# Patient Record
Sex: FEMALE | Race: WHITE | NOT HISPANIC OR LATINO | Employment: UNEMPLOYED | URBAN - NONMETROPOLITAN AREA
[De-identification: names, ages, dates, MRNs, and addresses within clinical notes are randomized per-mention and may not be internally consistent; named-entity substitution may affect disease eponyms.]

---

## 2020-07-21 ENCOUNTER — HOSPITAL ENCOUNTER (INPATIENT)
Facility: HOSPITAL | Age: 25
LOS: 2 days | Discharge: HOME | DRG: 571 | End: 2020-07-23
Attending: EMERGENCY MEDICINE | Admitting: FAMILY MEDICINE

## 2020-07-21 DIAGNOSIS — T14.8XXA WOUND INFECTION: Primary | ICD-10-CM

## 2020-07-21 DIAGNOSIS — L03.115 CELLULITIS OF RIGHT LOWER LEG: ICD-10-CM

## 2020-07-21 DIAGNOSIS — L08.9 WOUND INFECTION: Primary | ICD-10-CM

## 2020-07-21 PROBLEM — Z72.0 TOBACCO ABUSE: Status: ACTIVE | Noted: 2020-07-21

## 2020-07-21 LAB
ALBUMIN SERPL BCP-MCNC: 3.7 G/DL (ref 3.5–5)
ALP SERPL-CCNC: 129 U/L (ref 46–116)
ALT SERPL W P-5'-P-CCNC: 44 U/L (ref 12–78)
ANION GAP SERPL CALCULATED.3IONS-SCNC: 10 MMOL/L (ref 4–13)
AST SERPL W P-5'-P-CCNC: 20 U/L (ref 5–45)
BASOPHILS # BLD AUTO: 0.07 THOUSANDS/ΜL (ref 0–0.1)
BASOPHILS NFR BLD AUTO: 1 % (ref 0–1)
BILIRUB SERPL-MCNC: 0.26 MG/DL (ref 0.2–1)
BUN SERPL-MCNC: 8 MG/DL (ref 5–25)
CALCIUM SERPL-MCNC: 8.9 MG/DL (ref 8.3–10.1)
CHLORIDE SERPL-SCNC: 104 MMOL/L (ref 100–108)
CO2 SERPL-SCNC: 25 MMOL/L (ref 21–32)
CREAT SERPL-MCNC: 0.87 MG/DL (ref 0.6–1.3)
EOSINOPHIL # BLD AUTO: 0.18 THOUSAND/ΜL (ref 0–0.61)
EOSINOPHIL NFR BLD AUTO: 2 % (ref 0–6)
ERYTHROCYTE [DISTWIDTH] IN BLOOD BY AUTOMATED COUNT: 13.1 % (ref 11.6–15.1)
GFR SERPL CREATININE-BSD FRML MDRD: 93 ML/MIN/1.73SQ M
GLUCOSE SERPL-MCNC: 89 MG/DL (ref 65–140)
HCT VFR BLD AUTO: 44.5 % (ref 34.8–46.1)
HGB BLD-MCNC: 14.7 G/DL (ref 11.5–15.4)
IMM GRANULOCYTES # BLD AUTO: 0.05 THOUSAND/UL (ref 0–0.2)
IMM GRANULOCYTES NFR BLD AUTO: 0 % (ref 0–2)
LACTATE SERPL-SCNC: 0.7 MMOL/L (ref 0.5–2)
LYMPHOCYTES # BLD AUTO: 3.26 THOUSANDS/ΜL (ref 0.6–4.47)
LYMPHOCYTES NFR BLD AUTO: 27 % (ref 14–44)
MCH RBC QN AUTO: 30.7 PG (ref 26.8–34.3)
MCHC RBC AUTO-ENTMCNC: 33 G/DL (ref 31.4–37.4)
MCV RBC AUTO: 93 FL (ref 82–98)
MONOCYTES # BLD AUTO: 0.66 THOUSAND/ΜL (ref 0.17–1.22)
MONOCYTES NFR BLD AUTO: 6 % (ref 4–12)
NEUTROPHILS # BLD AUTO: 7.69 THOUSANDS/ΜL (ref 1.85–7.62)
NEUTS SEG NFR BLD AUTO: 64 % (ref 43–75)
NRBC BLD AUTO-RTO: 0 /100 WBCS
PLATELET # BLD AUTO: 345 THOUSANDS/UL (ref 149–390)
PMV BLD AUTO: 10 FL (ref 8.9–12.7)
POTASSIUM SERPL-SCNC: 4.2 MMOL/L (ref 3.5–5.3)
PROT SERPL-MCNC: 7.6 G/DL (ref 6.4–8.2)
RBC # BLD AUTO: 4.79 MILLION/UL (ref 3.81–5.12)
SODIUM SERPL-SCNC: 139 MMOL/L (ref 136–145)
WBC # BLD AUTO: 11.91 THOUSAND/UL (ref 4.31–10.16)

## 2020-07-21 PROCEDURE — 96365 THER/PROPH/DIAG IV INF INIT: CPT

## 2020-07-21 PROCEDURE — 99285 EMERGENCY DEPT VISIT HI MDM: CPT | Performed by: EMERGENCY MEDICINE

## 2020-07-21 PROCEDURE — 87205 SMEAR GRAM STAIN: CPT | Performed by: EMERGENCY MEDICINE

## 2020-07-21 PROCEDURE — 83605 ASSAY OF LACTIC ACID: CPT | Performed by: EMERGENCY MEDICINE

## 2020-07-21 PROCEDURE — 85025 COMPLETE CBC W/AUTO DIFF WBC: CPT | Performed by: EMERGENCY MEDICINE

## 2020-07-21 PROCEDURE — 87040 BLOOD CULTURE FOR BACTERIA: CPT | Performed by: EMERGENCY MEDICINE

## 2020-07-21 PROCEDURE — 80053 COMPREHEN METABOLIC PANEL: CPT | Performed by: EMERGENCY MEDICINE

## 2020-07-21 PROCEDURE — 87077 CULTURE AEROBIC IDENTIFY: CPT | Performed by: EMERGENCY MEDICINE

## 2020-07-21 PROCEDURE — 99222 1ST HOSP IP/OBS MODERATE 55: CPT | Performed by: FAMILY MEDICINE

## 2020-07-21 PROCEDURE — 87186 SC STD MICRODIL/AGAR DIL: CPT | Performed by: EMERGENCY MEDICINE

## 2020-07-21 PROCEDURE — 36415 COLL VENOUS BLD VENIPUNCTURE: CPT | Performed by: EMERGENCY MEDICINE

## 2020-07-21 PROCEDURE — 87070 CULTURE OTHR SPECIMN AEROBIC: CPT | Performed by: EMERGENCY MEDICINE

## 2020-07-21 PROCEDURE — 99284 EMERGENCY DEPT VISIT MOD MDM: CPT

## 2020-07-21 RX ORDER — CEFEPIME HYDROCHLORIDE 2 G/50ML
2000 INJECTION, SOLUTION INTRAVENOUS EVERY 12 HOURS
Status: DISCONTINUED | OUTPATIENT
Start: 2020-07-21 | End: 2020-07-23 | Stop reason: HOSPADM

## 2020-07-21 RX ORDER — ACETAMINOPHEN 325 MG/1
650 TABLET ORAL EVERY 6 HOURS PRN
Status: DISCONTINUED | OUTPATIENT
Start: 2020-07-21 | End: 2020-07-23 | Stop reason: HOSPADM

## 2020-07-21 RX ORDER — ONDANSETRON 2 MG/ML
4 INJECTION INTRAMUSCULAR; INTRAVENOUS EVERY 6 HOURS PRN
Status: DISCONTINUED | OUTPATIENT
Start: 2020-07-21 | End: 2020-07-23 | Stop reason: HOSPADM

## 2020-07-21 RX ORDER — POLYETHYLENE GLYCOL 3350 17 G/17G
17 POWDER, FOR SOLUTION ORAL DAILY PRN
Status: DISCONTINUED | OUTPATIENT
Start: 2020-07-21 | End: 2020-07-23 | Stop reason: HOSPADM

## 2020-07-21 RX ORDER — VANCOMYCIN HYDROCHLORIDE 1 G/200ML
1000 INJECTION, SOLUTION INTRAVENOUS EVERY 8 HOURS
Status: DISCONTINUED | OUTPATIENT
Start: 2020-07-21 | End: 2020-07-22 | Stop reason: DRUGHIGH

## 2020-07-21 RX ORDER — NICOTINE 21 MG/24HR
1 PATCH, TRANSDERMAL 24 HOURS TRANSDERMAL DAILY
Status: DISCONTINUED | OUTPATIENT
Start: 2020-07-21 | End: 2020-07-23 | Stop reason: HOSPADM

## 2020-07-21 RX ADMIN — CEFEPIME HYDROCHLORIDE 2000 MG: 2 INJECTION, SOLUTION INTRAVENOUS at 16:42

## 2020-07-21 RX ADMIN — NICOTINE 1 PATCH: 21 PATCH TRANSDERMAL at 16:42

## 2020-07-21 RX ADMIN — SODIUM CHLORIDE 1000 ML: 0.9 INJECTION, SOLUTION INTRAVENOUS at 12:37

## 2020-07-21 RX ADMIN — VANCOMYCIN HYDROCHLORIDE 1000 MG: 1 INJECTION, SOLUTION INTRAVENOUS at 21:36

## 2020-07-21 RX ADMIN — VANCOMYCIN HYDROCHLORIDE 1500 MG: 5 INJECTION, POWDER, LYOPHILIZED, FOR SOLUTION INTRAVENOUS at 12:38

## 2020-07-21 NOTE — ASSESSMENT & PLAN NOTE
Patient noted to have a cellulitis on the right lower extremity at the site of recent injury after a fall which was 2 months ago  Patient also has 3 infected wounds on the leg as well  Wound cultures done  Patient has already used 2 weeks of amoxicillin at home with failure of treatment  Will place on IV vancomycin and cefepime for now  Consult placed to wound care    Monitor for now

## 2020-07-21 NOTE — ED PROVIDER NOTES
History  Chief Complaint   Patient presents with    Wound Infection     Pt [place] she hit her leg on something and since then she developed a wound on her R ankle that will not heal  Pt denies seeing anyone for this issue     Patient complains of right lower leg pain and wound infection  She states that 2 months ago she jumped in a creek and scraped her leg against a rock  She states that it has been red and swollen over the past several weeks but that more recently several areas have opened up and are now draining  She denies any fevers or chills  No other injuries or complaints  History provided by:  Patient   used: No    Leg Pain   Location:  Leg  Time since incident:  2 months  Injury: yes    Leg location:  R lower leg  Pain details:     Radiates to:  Does not radiate    Severity:  Moderate    Onset quality:  Gradual    Duration:  2 months    Timing:  Constant    Progression:  Worsening  Chronicity:  New  Prior injury to area:  Yes  Relieved by:  Nothing  Worsened by:  Nothing  Ineffective treatments:  None tried  Associated symptoms: swelling    Associated symptoms: no back pain, no decreased ROM, no fever, no muscle weakness, no neck pain, no numbness, no stiffness and no tingling        None       History reviewed  No pertinent past medical history  History reviewed  No pertinent surgical history  Family History   Problem Relation Age of Onset    Arthritis Maternal Grandmother      I have reviewed and agree with the history as documented      E-Cigarette/Vaping     E-Cigarette/Vaping Substances     Social History     Tobacco Use    Smoking status: Current Every Day Smoker     Packs/day: 0 50    Smokeless tobacco: Never Used   Substance Use Topics    Alcohol use: Yes     Frequency: Monthly or less     Drinks per session: 1 or 2     Binge frequency: Never    Drug use: Not Currently       Review of Systems   Constitutional: Negative for chills and fever  HENT: Negative for ear pain, hearing loss, sore throat, trouble swallowing and voice change  Eyes: Negative for pain and discharge  Respiratory: Negative for cough, shortness of breath and wheezing  Cardiovascular: Negative for chest pain and palpitations  Gastrointestinal: Negative for abdominal pain, blood in stool, constipation, diarrhea, nausea and vomiting  Genitourinary: Negative for dysuria, flank pain, frequency and hematuria  Musculoskeletal: Negative for back pain, joint swelling, neck pain, neck stiffness and stiffness  Skin: Negative for rash and wound  Neurological: Negative for dizziness, seizures, syncope, facial asymmetry and headaches  Psychiatric/Behavioral: Negative for hallucinations, self-injury and suicidal ideas  All other systems reviewed and are negative  Physical Exam  Physical Exam   Constitutional: She is oriented to person, place, and time  She appears well-developed and well-nourished  No distress  HENT:   Head: Normocephalic and atraumatic  Right Ear: External ear normal    Left Ear: External ear normal    Eyes: Pupils are equal, round, and reactive to light  Conjunctivae and EOM are normal  Right eye exhibits no discharge  Left eye exhibits no discharge  Neck: Normal range of motion  Neck supple  Cardiovascular: Regular rhythm and normal heart sounds  No murmur heard  Tachycardic     Pulmonary/Chest: Effort normal and breath sounds normal  No respiratory distress  She has no wheezes  She has no rales  Abdominal: Soft  Bowel sounds are normal  She exhibits no distension  There is no tenderness  There is no rebound and no guarding  Musculoskeletal: Normal range of motion  She exhibits no edema or deformity  Neurological: She is alert and oriented to person, place, and time  No cranial nerve deficit  Skin: Skin is warm and dry  No rash noted     Cellulitic change with ulcerative wound which appears infected in right lateral lower leg as pictured below  Psychiatric: She has a normal mood and affect  Her behavior is normal    Nursing note and vitals reviewed  Vital Signs  ED Triage Vitals [07/21/20 1205]   Temperature Pulse Respirations Blood Pressure SpO2   (!) 97 3 °F (36 3 °C) (!) 115 20 137/76 98 %      Temp Source Heart Rate Source Patient Position - Orthostatic VS BP Location FiO2 (%)   Temporal Monitor Lying Right arm --      Pain Score       3           Vitals:    07/21/20 1400 07/21/20 1430 07/21/20 1443 07/21/20 1714   BP: 134/93 134/93 (!) 135/108 (!) 131/103   Pulse: 83 95 87 93   Patient Position - Orthostatic VS:             Visual Acuity      ED Medications  Medications   ondansetron (ZOFRAN) injection 4 mg (has no administration in time range)   polyethylene glycol (MIRALAX) packet 17 g (has no administration in time range)   nicotine (NICODERM CQ) 21 mg/24 hr TD 24 hr patch 1 patch (1 patch Transdermal Medication Applied 7/21/20 1642)   acetaminophen (TYLENOL) tablet 650 mg (has no administration in time range)   cefepime (MAXIPIME) IVPB (premix) 2,000 mg 50 mL (2,000 mg Intravenous New Bag 7/21/20 1642)   vancomycin (VANCOCIN) IVPB (premix) 1,000 mg 200 mL (has no administration in time range)   sodium chloride 0 9 % bolus 1,000 mL (1,000 mL Intravenous New Bag 7/21/20 1237)   vancomycin (VANCOCIN) 1,500 mg in sodium chloride 0 9 % 250 mL IVPB (1,500 mg Intravenous New Bag 7/21/20 1238)       Diagnostic Studies  Results Reviewed     Procedure Component Value Units Date/Time    Blood culture #2 [[id number]] Collected:  07/21/20 1229    Lab Status:  Preliminary result Specimen:  Blood from Arm, Right Updated:  07/21/20 1802     Blood Culture Received in Microbiology Lab  Culture in Progress  Blood culture #1 [[id number]] Collected:  07/21/20 1229    Lab Status:  Preliminary result Specimen:  Blood from Arm, Left Updated:  07/21/20 1802     Blood Culture Received in Microbiology Lab   Culture in Progress  Lactic acid, plasma [[id number]]  (Normal) Collected:  07/21/20 1326    Lab Status:  Final result Specimen:  Blood from Arm, Left Updated:  07/21/20 1351     LACTIC ACID 0 7 mmol/L     Narrative:       Result may be elevated if tourniquet was used during collection  Comprehensive metabolic panel [[id number]]  (Abnormal) Collected:  07/21/20 1229    Lab Status:  Final result Specimen:  Blood from Arm, Left Updated:  07/21/20 1249     Sodium 139 mmol/L      Potassium 4 2 mmol/L      Chloride 104 mmol/L      CO2 25 mmol/L      ANION GAP 10 mmol/L      BUN 8 mg/dL      Creatinine 0 87 mg/dL      Glucose 89 mg/dL      Calcium 8 9 mg/dL      AST 20 U/L      ALT 44 U/L      Alkaline Phosphatase 129 U/L      Total Protein 7 6 g/dL      Albumin 3 7 g/dL      Total Bilirubin 0 26 mg/dL      eGFR 93 ml/min/1 73sq m     Narrative:       [place] guidelines for Chronic Kidney Disease (CKD):     Stage 1 with normal or high GFR (GFR > 90 mL/min/1 73 square meters)    Stage 2 Mild CKD (GFR = 60-89 mL/min/1 73 square meters)    Stage 3A Moderate CKD (GFR = 45-59 mL/min/1 73 square meters)    Stage 3B Moderate CKD (GFR = 30-44 mL/min/1 73 square meters)    Stage 4 Severe CKD (GFR = 15-29 mL/min/1 73 square meters)    Stage 5 End Stage CKD (GFR <15 mL/min/1 73 square meters)  Note: GFR calculation is accurate only with a steady state creatinine    Wound culture and Gram stain [[id number]] Collected:  07/21/20 1229    Lab Status:   In process Specimen:  Wound from Leg, Right Updated:  07/21/20 1243    CBC and differential [[id number]]  (Abnormal) Collected:  07/21/20 1229    Lab Status:  Final result Specimen:  Blood from Arm, Left Updated:  07/21/20 1236     WBC 11 91 Thousand/uL      RBC 4 79 Million/uL      Hemoglobin 14 7 g/dL      Hematocrit 44 5 %      MCV 93 fL      MCH 30 7 pg      MCHC 33 0 g/dL      RDW 13 1 %      MPV 10 0 fL      Platelets 348 Thousands/uL      nRBC 0 /100 WBCs Neutrophils Relative 64 %      Immat GRANS % 0 %      Lymphocytes Relative 27 %      Monocytes Relative 6 %      Eosinophils Relative 2 %      Basophils Relative 1 %      Neutrophils Absolute 7 69 Thousands/µL      Immature Grans Absolute 0 05 Thousand/uL      Lymphocytes Absolute 3 26 Thousands/µL      Monocytes Absolute 0 66 Thousand/µL      Eosinophils Absolute 0 18 Thousand/µL      Basophils Absolute 0 07 Thousands/µL                  No orders to display              Procedures  Procedures         ED Course       US AUDIT      Most Recent Value   Initial Alcohol Screen: US AUDIT-C    1  How often do you have a drink containing alcohol?  0 Filed at: 07/21/2020 1206   2  How many drinks containing alcohol do you have on a typical day you are drinking? 0 Filed at: 07/21/2020 1206   3a  Male UNDER 65: How often do you have five or more drinks on one occasion? 0 Filed at: 07/21/2020 1206   3b  FEMALE Any Age, or MALE 65+: How often do you have 4 or more drinks on one occassion? 0 Filed at: 07/21/2020 1206   Audit-C Score  0 Filed at: 07/21/2020 1206                  MARITO/DAST-10      Most Recent Value   How many times in the past year have you    Used an illegal drug or used a prescription medication for non-medical reasons?   Never Filed at: 07/21/2020 1206                                MDM  Number of Diagnoses or Management Options     Amount and/or Complexity of Data Reviewed  Clinical lab tests: ordered and reviewed  Decide to obtain previous medical records or to obtain history from someone other than the patient: yes  Review and summarize past medical records: yes  Discuss the patient with other providers: yes  Independent visualization of images, tracings, or specimens: yes          Disposition  Final diagnoses:   Wound infection   Cellulitis of right lower leg     Time reflects when diagnosis was documented in both MDM as applicable and the Disposition within this note     Time User Action Codes Description Comment    7/21/2020  1:43 PM Jesse Ballard [[id number]  [id number],  L08 [id number]] Wound infection     7/21/2020  1:43 PM Jesse Ballard [[id number]] Cellulitis of right lower leg       ED Disposition     ED Disposition Condition Date/Time Comment    Admit Stable Tue Jul 21, 2020  1:55 PM Case was discussed with Dr Jarrell Nelson and the patient's admission status was agreed to be Admission Status: inpatient status to the service of Dr Jarrell Nelson   Follow-up Information    None         There are no discharge medications for this patient  No discharge procedures on file      PDMP Review     None          ED Provider  Electronically Signed by           Curtis Rene MD  07/21/20 4912

## 2020-07-21 NOTE — ASSESSMENT & PLAN NOTE
Patient noted to have 3 wounds on her right shin area  Picture present in media files  Asked wound care for evaluation    Continue IV antibiotics

## 2020-07-21 NOTE — DISCHARGE INSTR - OTHER ORDERS
Plan:   1  Skin nourishing cream to the skin daily   2  Right lateral leg wounds - cleanse with Normal saline then apply silvasorb gel then top with adaptic then a gauze then a ABD and esther wrap change daily   3   Upon discharge patient will need to follow at a outpatient wound center

## 2020-07-21 NOTE — CONSULTS
Consult Note - Wound   Marina Maurice 21 y o  female MRN: [id number]  Unit/Bed#: -01 Encounter: [id number]      History and Present Illness: Patient is a 21year old female that obtained a lower leg injury approximately 2 months ago   [place] and fell on a [place]   She reports that she had a large indentation of the leg and had no feeling in that area immediately after the fall   She reports no open areas at that time   She started to see open areas approximately 2 weeks ago   Cellulitis of the right lower leg with 3 infected wounds   Assessment Findings:   1  Right lateral anterior lower leg with 23 full thickness wound bed openings   Proximal wound measuring 1 6 x 1 4 x 0 2 , center wound bed measuring 2 4 x 1 7 x 0 5 and the distal  wound bed measuring 2 7 x 2 5 x x 0 3   All 3 wounds measured together in the flow sheet as 8 x 2 3 x 0 5   Wound beds are slough and brown tissue with slight pink noted   Erythema and induration of the rosalva wound that is marked   No sensation of the area when probed for the depth   Center wound bed has small drainage when palpation   Proximal and distal wound beds are dry   Silvasorb gel ordered to [employer]  debride the wound bed and treat at the wound bed level the antimicrobial for the infection   Discussed the findings with the patient , MD , RN and case management   Surgery consulted as per MD due the need for further debridement of the wound beds   Patient will need wound care and follow up at a wound center upon discharge   Plan:   1  Skin nourishing cream to the skin daily   2  Right lateral leg wounds - cleanse with Normal saline then apply silvasorb gel then top with adaptic then a gauze then a ABD and esther wrap change daily   3  Upon discharge patient will need to follow at a outpatient wound center           Vitals: Blood pressure (!) 135/108, pulse 87, temperature (!) 97 3 °F (36 3 °C), resp  rate 20, height 5' 3" (1 6 m), weight 98 9 kg (218 lb 0 6 oz), last menstrual period 07/07/2020, SpO2 98 %  ,Body mass index is 38 62 kg/m²  Wound 07/21/20 Pretibial Right (Active)   Wound Image    7/21/2020  2:52 PM   Wound Description Brown;Fragile;Pink;Slough 7/21/2020  3:00 PM   Tiff-wound Assessment Edema; Erythema;Fragile;Pink 7/21/2020  3:00 PM   Wound Length (cm) 8 cm 7/21/2020  3:00 PM   Wound Width (cm) 2 3 cm 7/21/2020  3:00 PM   Wound Depth (cm) 0 5 7/21/2020  3:00 PM   Calculated Wound Area (cm^2) 18 4 cm^2 7/21/2020  3:00 PM   Calculated Wound Volume (cm^3) 9 2 cm^3 7/21/2020  3:00 PM   Drainage Amount Small 7/21/2020  3:00 PM   Drainage Description Serosanguineous; Serous 7/21/2020  3:00 PM   Non-staged Wound Description Full thickness 7/21/2020  3:00 PM   Treatments Cleansed;Irrigation with NSS;Site care 7/21/2020  3:00 PM   Dressing Silvasorb gel 7/21/2020  3:00 PM   Dressing Changed Changed 7/21/2020  3:00 PM   Patient Tolerance Tolerated well 7/21/2020  3:00 PM     Wound care will follow weekly or tiger text concerns or questions     Corinna Stewart RN BSN CWOCN

## 2020-07-21 NOTE — PROGRESS NOTES
Vancomycin Assessment    Gaston Nicholas is a 21 y o  female who is currently receiving vancomycin 1500 mg IV for skin-soft tissue infection     Relevant clinical data and objective history reviewed:  Creatinine   Date Value Ref Range Status   07/21/2020 0 87 0 60 - 1 30 mg/dL Final     Comment:     Standardized to IDMS reference method     BP (!) 135/108   Pulse 87   Temp (!) 97 3 °F (36 3 °C)   Resp 20   Ht 5' 3" (1 6 m)   Wt 98 9 kg (218 lb 0 6 oz)   LMP 07/07/2020   SpO2 98%   BMI 38 62 kg/m²   No intake/output data recorded  Lab Results   Component Value Date/Time    BUN 8 07/21/2020 12:29 PM    WBC 11 91 (H) 07/21/2020 12:29 PM    HGB 14 7 07/21/2020 12:29 PM    HCT 44 5 07/21/2020 12:29 PM    MCV 93 07/21/2020 12:29 PM     07/21/2020 12:29 PM     Temp Readings from Last 3 Encounters:   07/21/20 (!) 97 3 °F (36 3 °C)     Vancomycin Days of Therapy: 1    Assessment/Plan  The patient is currently on vancomycin utilizing scheduled dosing  Baseline risks associated with therapy include: concomitant nephrotoxic medications and dehydration  The patient is receiving 1500 mg IV based on a goal of 15-20 (appropriate for most indications) ; however, after clinical evaluation will be changed to 1000 mg IV q 8 hrs  Pharmacy will continue to follow closely for s/sx of nephrotoxicity, infusion reactions and appropriateness of therapy  BMP and CBC will be ordered per protocol  Plan for trough as patient approaches steady state, prior to the 4th  dose at approximately 1200 on 07/22/20  Pharmacy will continue to follow the patients culture results and clinical progress daily      Sebastian Tate, Pharmacist

## 2020-07-21 NOTE — PLAN OF CARE
Problem: PAIN - ADULT  Goal: Verbalizes/displays adequate comfort level or baseline comfort level  Description  Interventions:  - Encourage patient to monitor pain and request assistance  - Assess pain using appropriate pain scale  - Administer analgesics based on type and severity of pain and evaluate response  - Implement non-pharmacological measures as appropriate and evaluate response  - Consider cultural and social influences on pain and pain management  - Notify physician/advanced practitioner if interventions unsuccessful or patient reports new pain  Outcome: Progressing     Problem: INFECTION - ADULT  Goal: Absence or prevention of progression during hospitalization  Description  INTERVENTIONS:  - Assess and monitor for signs and symptoms of infection  - Monitor lab/diagnostic results  - Monitor all insertion sites, i e  indwelling lines, tubes, and drains  - Monitor endotracheal if appropriate and nasal secretions for changes in amount and color  - [place] appropriate cooling/warming therapies per order  - Administer medications as ordered  - Instruct and encourage patient and family to use good hand hygiene technique  - Identify and instruct in appropriate isolation precautions for identified infection/condition  Outcome: Progressing     Problem: SAFETY ADULT  Goal: Patient will remain free of falls  Description  INTERVENTIONS:  - Assess patient frequently for physical needs  -  Identify cognitive and physical deficits and behaviors that affect risk of falls    -  [place] fall precautions as indicated by assessment   - Educate patient/family on patient safety including physical limitations  - Instruct patient to call for assistance with activity based on assessment  - Modify environment to reduce risk of injury  - Consider OT/PT consult to assist with strengthening/mobility  Outcome: Progressing  Goal: Maintain or return to baseline ADL function  Description  INTERVENTIONS:  -  Assess patient's ability to carry out ADLs; assess patient's baseline for ADL function and identify physical deficits which impact ability to perform ADLs (bathing, care of mouth/teeth, toileting, grooming, dressing, etc )  - Assess/evaluate cause of self-care deficits   - Assess range of motion  - Assess patient's mobility; develop plan if impaired  - Assess patient's need for assistive devices and provide as appropriate  - Encourage maximum independence but intervene and supervise when necessary  - Involve family in performance of ADLs  - Assess for home care needs following discharge   - Consider OT consult to assist with ADL evaluation and planning for discharge  - Provide patient education as appropriate  Outcome: Progressing  Goal: Maintain or return mobility status to optimal level  Description  INTERVENTIONS:  - Assess patient's baseline mobility status (ambulation, transfers, stairs, etc )    - Identify cognitive and physical deficits and behaviors that affect mobility  - Identify mobility aids required to assist with transfers and/or ambulation (gait belt, sit-to-stand, lift, walker, cane, etc )  - [place] fall precautions as indicated by assessment  - Record patient progress and toleration of activity level on Mobility SBAR; progress patient to next Phase/Stage  - Instruct patient to call for assistance with activity based on assessment  - Consider rehabilitation consult to assist with strengthening/weightbearing, etc   Outcome: Progressing     Problem: DISCHARGE PLANNING  Goal: Discharge to home or other facility with appropriate resources  Description  INTERVENTIONS:  - Identify barriers to discharge w/patient and caregiver  - Arrange for needed discharge resources and transportation as appropriate  - Identify discharge learning needs (meds, wound care, etc )  - Arrange for interpretive services to assist at discharge as needed  - Refer to Case Management Department for coordinating discharge planning if the patient needs post-hospital services based on physician/advanced practitioner order or complex needs related to functional status, cognitive ability, or social support system  Outcome: Progressing     Problem: Knowledge Deficit  Goal: Patient/family/caregiver demonstrates understanding of disease process, treatment plan, medications, and discharge instructions  Description  Complete learning assessment and assess knowledge base    Interventions:  - Provide teaching at level of understanding  - Provide teaching via preferred learning methods  Outcome: Progressing

## 2020-07-21 NOTE — H&P
H&P- Aisha Bingham 1995, 21 y o  female MRN: [id number]    Unit/Bed#: -01 Encounter: [id number]    Primary Care Provider: Julisa Benson MD   Date and time admitted to hospital: 7/21/2020 11:59 AM        * Cellulitis of right lower leg  Assessment & Plan  Patient noted to have a cellulitis on the right lower extremity at the site of recent injury after a fall which was 2 months ago  Patient also has 3 infected wounds on the leg as well  Wound cultures done  Patient has already used 2 weeks of amoxicillin at home with failure of treatment  Will place on IV vancomycin and cefepime for now  Consult placed to wound care  Monitor for now    Wound infection  Assessment & Plan  Patient noted to have 3 wounds on her right shin area  Picture present in media files  Asked wound care for evaluation  Continue IV antibiotics    Tobacco abuse  Assessment & Plan  Counseled on smoking cessation and placed on nicotine replacement therapy    Consult surgery for evaluation for possible debridement of the wound  VTE Prophylaxis: Pharmacologic VTE Prophylaxis contraindicated due to Low risk  / reason for no mechanical VTE prophylaxis Low risk   Code Status:  Full code  POLST: There is no POLST form on file for this patient (pre-hospital)  Discussion with family:  None    Anticipated Length of Stay:  Patient will be admitted on an Inpatient basis with an anticipated length of stay of  more than 2 midnights  Justification for Hospital Stay:  Acute right leg cellulitis with infected wound failure of outpatient treatment    Total Time for Visit, including Counseling / Coordination of Care: 1 hour  Greater than 50% of this total time spent on direct patient counseling and coordination of care  Chief Complaint:   Right leg wound    History of Present Illness:    Aisha Bingham is a 21 y o  female who presents with right leg wound  Patient states that she fell 2 months ago in her leg hit a rock    Since then she was having some induration of her right shin area  Following that a few days later developed swelling and redness and 3 wounds appeared  She took 2 week course of amoxicillin at home and also cared for her wound with cleaning it with hydrogen peroxide  The wound continued to worsen and she has sensory loss of the entire right shin  Also has a enlarging wounds that got her concerned and came to the ER  Complained of some fever and chills at home but none today    Review of Systems:    Review of Systems   Constitutional: Negative for appetite change, chills, fatigue and fever  HENT: Negative for hearing loss, sore throat and trouble swallowing  Eyes: Negative for photophobia, discharge and visual disturbance  Respiratory: Negative for chest tightness and shortness of breath  Cardiovascular: Negative for chest pain and palpitations  Gastrointestinal: Negative for abdominal pain, blood in stool and vomiting  Endocrine: Negative for polydipsia and polyuria  Genitourinary: Negative for difficulty urinating, dysuria, flank pain and hematuria  Musculoskeletal: Positive for myalgias  Negative for back pain and gait problem  Skin: Positive for wound  Negative for rash  Allergic/Immunologic: Negative for environmental allergies and food allergies  Neurological: Negative for dizziness, seizures, syncope and headaches  Hematological: Does not bruise/bleed easily  Psychiatric/Behavioral: Negative for behavioral problems  All other systems reviewed and are negative  Past Medical and Surgical History:     History reviewed  No pertinent past medical history  History reviewed  No pertinent surgical history  Meds/Allergies:    Prior to Admission medications    Not on File     I have reviewed home medications with patient personally    Took 2 weeks of amoxicillin otherwise is on no chronic medications    Allergies: No Known Allergies    Social History:     Marital Status: /Civil Union Social History     Substance and Sexual Activity   Alcohol Use Yes    Frequency: Monthly or less    Drinks per session: 1 or 2    Binge frequency: Never     Social History     Tobacco Use   Smoking Status Current Every Day Smoker    Packs/day: 0 50   Smokeless Tobacco Never Used     Social History     Substance and Sexual Activity   Drug Use Not Currently       Family History:    Family History   Problem Relation Age of Onset    Arthritis Maternal Grandmother        Physical Exam:     Vitals:   Blood Pressure: (!) 135/108 (07/21/20 1443)  Pulse: 87 (07/21/20 1443)  Temperature: (!) 97 3 °F (36 3 °C) (07/21/20 1443)  Temp Source: Temporal (07/21/20 1205)  Respirations: 20 (07/21/20 1443)  Height: 5' 3" (160 cm) (07/21/20 1205)  Weight - Scale: 98 9 kg (218 lb 0 6 oz) (07/21/20 1205)  SpO2: 98 % (07/21/20 1443)    Physical Exam   Constitutional: She is oriented to person, place, and time  She appears well-developed and well-nourished  HENT:   Head: Normocephalic and atraumatic  Right Ear: External ear normal    Left Ear: External ear normal    Mouth/Throat: Oropharynx is clear and moist    Eyes: Pupils are equal, round, and reactive to light  Conjunctivae and EOM are normal    Neck: Normal range of motion  Neck supple  Cardiovascular: Normal rate, regular rhythm, normal heart sounds and intact distal pulses  Pulmonary/Chest: Effort normal and breath sounds normal    Abdominal: Soft  Bowel sounds are normal  She exhibits no mass  There is no tenderness  There is no rebound and no guarding  Genitourinary:   Genitourinary Comments: deferred   Musculoskeletal: Normal range of motion  Entire lateral side of the right shin is red and swollen   Also loss of sensation noted on the right shin lateral aspect    Pulses palpable dorsalis pedis and posterior tibial bilaterally  Three open wounds present on the right lateral shin as evident in media pictures with some tunneling noted   Neurological: She is alert and oriented to person, place, and time  She has normal reflexes  Cranial nerves 2-12 are normal   Normal neurological exam   Skin: Skin is warm and dry  No rash noted  Psychiatric: She has a normal mood and affect  Nursing note and vitals reviewed  Additional Data:     Lab Results: I have personally reviewed pertinent reports  Results from last 7 days   Lab Units 07/21/20  1229   WBC Thousand/uL 11 91*   HEMOGLOBIN g/dL 14 7   HEMATOCRIT % 44 5   PLATELETS Thousands/uL 345   NEUTROS PCT % 64   LYMPHS PCT % 27   MONOS PCT % 6   EOS PCT % 2     Results from last 7 days   Lab Units 07/21/20  1229   SODIUM mmol/L 139   POTASSIUM mmol/L 4 2   CHLORIDE mmol/L 104   CO2 mmol/L 25   BUN mg/dL 8   CREATININE mg/dL 0 87   ANION GAP mmol/L 10   CALCIUM mg/dL 8 9   ALBUMIN g/dL 3 7   TOTAL BILIRUBIN mg/dL 0 26   ALK PHOS U/L 129*   ALT U/L 44   AST U/L 20   GLUCOSE RANDOM mg/dL 89                 Results from last 7 days   Lab Units 07/21/20  1326   LACTIC ACID mmol/L 0 7       Imaging: I have personally reviewed pertinent reports  No orders to display       EKG, Pathology, and Other Studies Reviewed on Admission:   · EKG:  None    Allscripts / Epic Records Reviewed: Yes     ** Please Note: This note has been constructed using a voice recognition system   **

## 2020-07-22 LAB
ANION GAP SERPL CALCULATED.3IONS-SCNC: 7 MMOL/L (ref 4–13)
BUN SERPL-MCNC: 8 MG/DL (ref 5–25)
CALCIUM SERPL-MCNC: 8.3 MG/DL (ref 8.3–10.1)
CHLORIDE SERPL-SCNC: 106 MMOL/L (ref 100–108)
CO2 SERPL-SCNC: 27 MMOL/L (ref 21–32)
CREAT SERPL-MCNC: 0.81 MG/DL (ref 0.6–1.3)
ERYTHROCYTE [DISTWIDTH] IN BLOOD BY AUTOMATED COUNT: 13.1 % (ref 11.6–15.1)
GFR SERPL CREATININE-BSD FRML MDRD: 101 ML/MIN/1.73SQ M
GLUCOSE SERPL-MCNC: 91 MG/DL (ref 65–140)
HCT VFR BLD AUTO: 43.2 % (ref 34.8–46.1)
HGB BLD-MCNC: 13.8 G/DL (ref 11.5–15.4)
MCH RBC QN AUTO: 29.6 PG (ref 26.8–34.3)
MCHC RBC AUTO-ENTMCNC: 31.9 G/DL (ref 31.4–37.4)
MCV RBC AUTO: 93 FL (ref 82–98)
PLATELET # BLD AUTO: 297 THOUSANDS/UL (ref 149–390)
PMV BLD AUTO: 10.2 FL (ref 8.9–12.7)
POTASSIUM SERPL-SCNC: 4 MMOL/L (ref 3.5–5.3)
RBC # BLD AUTO: 4.66 MILLION/UL (ref 3.81–5.12)
SODIUM SERPL-SCNC: 140 MMOL/L (ref 136–145)
TSH SERPL DL<=0.05 MIU/L-ACNC: 1.48 UIU/ML (ref 0.36–3.74)
VANCOMYCIN TROUGH SERPL-MCNC: 12.5 UG/ML (ref 10–20)
WBC # BLD AUTO: 9.32 THOUSAND/UL (ref 4.31–10.16)

## 2020-07-22 PROCEDURE — 99232 SBSQ HOSP IP/OBS MODERATE 35: CPT | Performed by: FAMILY MEDICINE

## 2020-07-22 PROCEDURE — 87185 SC STD ENZYME DETCJ PER NZM: CPT | Performed by: FAMILY MEDICINE

## 2020-07-22 PROCEDURE — 84443 ASSAY THYROID STIM HORMONE: CPT | Performed by: FAMILY MEDICINE

## 2020-07-22 PROCEDURE — 11042 DBRDMT SUBQ TIS 1ST 20SQCM/<: CPT

## 2020-07-22 PROCEDURE — 85027 COMPLETE CBC AUTOMATED: CPT | Performed by: FAMILY MEDICINE

## 2020-07-22 PROCEDURE — 80048 BASIC METABOLIC PNL TOTAL CA: CPT | Performed by: FAMILY MEDICINE

## 2020-07-22 PROCEDURE — 99254 IP/OBS CNSLTJ NEW/EST MOD 60: CPT

## 2020-07-22 PROCEDURE — 87075 CULTR BACTERIA EXCEPT BLOOD: CPT | Performed by: FAMILY MEDICINE

## 2020-07-22 PROCEDURE — 0JBN0ZZ EXCISION OF RIGHT LOWER LEG SUBCUTANEOUS TISSUE AND FASCIA, OPEN APPROACH: ICD-10-PCS | Performed by: SURGERY

## 2020-07-22 PROCEDURE — 0KBS0ZZ EXCISION OF RIGHT LOWER LEG MUSCLE, OPEN APPROACH: ICD-10-PCS | Performed by: SURGERY

## 2020-07-22 PROCEDURE — 80202 ASSAY OF VANCOMYCIN: CPT | Performed by: FAMILY MEDICINE

## 2020-07-22 RX ADMIN — VANCOMYCIN HYDROCHLORIDE 1000 MG: 1 INJECTION, SOLUTION INTRAVENOUS at 13:35

## 2020-07-22 RX ADMIN — VANCOMYCIN HYDROCHLORIDE 1000 MG: 1 INJECTION, SOLUTION INTRAVENOUS at 05:01

## 2020-07-22 RX ADMIN — CEFEPIME HYDROCHLORIDE 2000 MG: 2 INJECTION, SOLUTION INTRAVENOUS at 17:28

## 2020-07-22 RX ADMIN — VANCOMYCIN HYDROCHLORIDE 1250 MG: 5 INJECTION, POWDER, LYOPHILIZED, FOR SOLUTION INTRAVENOUS at 19:47

## 2020-07-22 RX ADMIN — CEFEPIME HYDROCHLORIDE 2000 MG: 2 INJECTION, SOLUTION INTRAVENOUS at 03:05

## 2020-07-22 NOTE — CONSULTS
Consultation - General Surgery   Sylvester Villar 21 y o  female MRN: [id number]  Unit/Bed#: -01 Encounter: [id number]    Assessment/Plan     Assessment: Three open wounds right lateral shin area, all appear infected with surrounding cellulitis  Patient had 2 weeks of amoxicillin at home with failure of self treatment which she had left over from a previous right upper dental abscess about a month ago  Injury sustained secondary to fall in the stream about 2 months ago  Patient will require bedside debridement later today  Tobacco abuse disorder    Plan:  Patient had breakfast this morning  Discussed with Dr Boogie who will see the patient later today  Plan for bedside debridement 3 open infected wounds right lateral lower leg  Continue present IV antibiotics with vancomycin and cefepime  Anaerobic and aerobic wound cultures obtained, results pending  Discussed with attending hospitalist physician, likely would treat with another day of IV antibiotics and possible discharge home tomorrow with continuation of oral antibiotic therapy then follow-up with the general surgeon in the office in 1-2 weeks  Cover wound with Vaseline gauze, sterile 4x4s and ABD dressing, wrap with Pro  Elevate right lower leg while supine or in bed    History of Present Illness     HPI:  Sylvester Villar is a 21 y o  female who presents with cellulitis of the right lower leg  The patient has 3 open wounds of the right lateral shin  She states that she fell about 2 months ago while swimming in a stream and hurt her lateral leg on a rock  She was having some redness of the right shin and a few days later had development of 3 wounds which opened up and had been draining  She has noted a yellow mucoid drainage from the areas  The area is swollen with surrounding erythema to the right lateral shin  She reports some numbness to the right heel  No report of any fever or chills      The patient started taking amoxicillin once a day for about 2 weeks at home which she had left over from a dental abscess in her right upper mouth from her dentist in the [facility] area  Prior to this she did not seek any evaluation for her right lower leg cellulitis and open wounds  Consults    Review of Systems   Constitutional: Negative for activity change, appetite change, chills, diaphoresis, fatigue, fever and unexpected weight change  HENT: Negative  Eyes: Negative  Respiratory: Negative  Cardiovascular: Negative  Gastrointestinal: Negative  Endocrine: Negative  Genitourinary: Negative  Musculoskeletal: Negative for arthralgias, back pain, gait problem, joint swelling, myalgias, neck pain and neck stiffness  Skin: Positive for color change and wound (As documented above in the HPI )  Allergic/Immunologic: Negative  Neurological: Positive for numbness (She complaints of numbness on her right heel)  Hematological: Negative  Psychiatric/Behavioral: Negative  Historical Information   History reviewed  No pertinent past medical history  History reviewed  No pertinent surgical history    Social History   Social History     Substance and Sexual Activity   Alcohol Use Yes    Frequency: Monthly or less    Drinks per session: 1 or 2    Binge frequency: Never     Social History     Substance and Sexual Activity   Drug Use Not Currently     E-Cigarette/Vaping     E-Cigarette/Vaping Substances     Social History     Tobacco Use   Smoking Status Current Every Day Smoker    Packs/day: 0 50   Smokeless Tobacco Never Used     Family History: non-contributory    Meds/Allergies   current meds:   Current Facility-Administered Medications   Medication Dose Route Frequency    acetaminophen (TYLENOL) tablet 650 mg  650 mg Oral Q6H PRN    cefepime (MAXIPIME) IVPB (premix) 2,000 mg 50 mL  2,000 mg Intravenous Q12H    nicotine (NICODERM CQ) 21 mg/24 hr TD 24 hr patch 1 patch  1 patch Transdermal Daily    ondansetron (ZOFRAN) injection 4 mg  4 mg Intravenous Q6H PRN    polyethylene glycol (MIRALAX) packet 17 g  17 g Oral Daily PRN    vancomycin (VANCOCIN) IVPB (premix) 1,000 mg 200 mL  1,000 mg Intravenous Q8H     No Known Allergies    Objective   First Vitals:   Blood Pressure: 137/76 (07/21/20 1205)  Pulse: (!) 115 (07/21/20 1205)  Temperature: (!) 97 3 °F (36 3 °C) (07/21/20 1205)  Temp Source: Temporal (07/21/20 1205)  Respirations: 20 (07/21/20 1205)  Height: 5' 3" (160 cm) (07/21/20 1205)  Weight - Scale: 98 9 kg (218 lb 0 6 oz) (07/21/20 1205)  SpO2: 98 % (07/21/20 1205)    Current Vitals:   Blood Pressure: 121/76 (07/22/20 0739)  Pulse: 82 (07/22/20 0739)  Temperature: 97 6 °F (36 4 °C) (07/22/20 0703)  Temp Source: Temporal (07/21/20 1205)  Respirations: 19 (07/22/20 0703)  Height: 5' 3" (160 cm) (07/21/20 1205)  Weight - Scale: 98 9 kg (218 lb 0 6 oz) (07/21/20 1205)  SpO2: 98 % (07/22/20 0750)      Intake/Output Summary (Last 24 hours) at 7/22/2020 0951  Last data filed at 7/22/2020 1756  Gross per 24 hour   Intake 1290 ml   Output --   Net 1290 ml       Invasive Devices     Peripheral Intravenous Line            Peripheral IV 07/21/20 Left Antecubital less than 1 day                Physical Exam   Constitutional: She is oriented to person, place, and time  Patient is obese, awake and alert  She is texting on her phone upon initial presentation  HENT:   Head: Normocephalic and atraumatic  Eyes: Pupils are equal, round, and reactive to light  EOM are normal    Neck: Normal range of motion  Neck supple  Cardiovascular: Normal rate, regular rhythm, normal heart sounds and intact distal pulses  Exam reveals no gallop and no friction rub  No murmur heard  Pulmonary/Chest: Effort normal and breath sounds normal  She has no wheezes  Abdominal: Soft  Bowel sounds are normal  She exhibits no distension and no mass  There is no tenderness  Musculoskeletal: She exhibits tenderness     Neurological: She is alert and oriented to person, place, and time  No cranial nerve deficit or sensory deficit  Skin: Skin is warm and dry  There is erythema  No pallor  Inspection of the right lateral shin reveals 3 open wounds  Proximal 1 6 x 1 4 cm, medial 1 7 x 2 4 cm, distal 2 5 x 2 7 cm  All wounds have granulation with greenish brown slough off which is easily scraped with a Q-tip  All 3 wounds are surrounded with erythema and tender to touch though without fluctuance  There is some scant yellow drainage and some undermining of the top 2 wounds on the medial aspects of each  There does not appear to be any skin sloughing or necrosis of the skin bridge between 3 wounds  Psychiatric: She has a normal mood and affect  Her behavior is normal  Judgment normal       Lab Results:   I have personally reviewed pertinent lab results  , CBC:   Lab Results   Component Value Date    WBC 9 32 07/22/2020    HGB 13 8 07/22/2020    HCT 43 2 07/22/2020    MCV 93 07/22/2020     07/22/2020    MCH 29 6 07/22/2020    MCHC 31 9 07/22/2020    RDW 13 1 07/22/2020    MPV 10 2 07/22/2020    NRBC 0 07/21/2020   , CMP:   Lab Results   Component Value Date    SODIUM 140 07/22/2020    K 4 0 07/22/2020     07/22/2020    CO2 27 07/22/2020    BUN 8 07/22/2020    CREATININE 0 81 07/22/2020    CALCIUM 8 3 07/22/2020    AST 20 07/21/2020    ALT 44 07/21/2020    ALKPHOS 129 (H) 07/21/2020    EGFR 101 07/22/2020     Imaging: I have personally reviewed pertinent reports  EKG, Pathology, and Other Studies: I have personally reviewed pertinent reports  Counseling / Coordination of Care  Total floor / unit time spent today 40 minutes  Greater than 50% of total time was spent with the patient and / or family counseling and / or coordination of care    A description of the counseling / coordination of care:  Review of medical records and laboratory results, personal examination patient, discussion with the consulting general surgeon will also examine the patient and most likely perform bedside debridement of the 3 open wounds of the right lateral lower leg later today        Whitley Delgado PA-C

## 2020-07-22 NOTE — PROGRESS NOTES
Progress Note - Ryan Drafts 1995, 21 y o  female MRN: [id number]    Unit/Bed#: -01 Encounter: [id number]    Primary Care Provider: Dandy Jacinto MD   Date and time admitted to hospital: 7/21/2020 11:59 AM        * Cellulitis of right lower leg  Assessment & Plan  Patient noted to have a cellulitis on the right lower extremity at the site of recent injury after a fall which was 2 months ago  She fell close to his stream and her leg hit a rock  Patient also has 3 infected wounds on the leg as well  Wound cultures done  Patient has already used 2 weeks of amoxicillin at home with failure of treatment  Will place on IV vancomycin and cefepime for now  Discussed with wound care and surgery  Plan for bedside debridement with surgery today    Wound infection  Assessment & Plan  Patient noted to have 3 wounds on her right shin area  Picture present in media files  For bedside debridement today  Continue wound care recommendations  Discussed with surgery  Continue IV antibiotics    Tobacco abuse  Assessment & Plan  Counseled on smoking cessation and placed on nicotine replacement therapy      VTE Pharmacologic Prophylaxis:   Pharmacologic: Pharmacologic VTE Prophylaxis contraindicated due to Low risk  Mechanical VTE Prophylaxis in Place: No  Encourage ambulation  Patient Centered Rounds: I have performed bedside rounds with nursing staff today  Discussions with Specialists or Other Care Team Provider:  Discussed with surgery and wound care    Education and Discussions with Family / Patient:  Discussed with patient at bedside    Time Spent for Care: 30 minutes  More than 50% of total time spent on counseling and coordination of care as described above      Current Length of Stay: 1 day(s)    Current Patient Status: Inpatient   Certification Statement:  The patient require inpatient hospital stay due to right leg cellulitis with infected wounds with failure of outpatient treatment    Discharge Plan:  Possible discharge home tomorrow    Code Status: Level 1 - Full Code      Subjective:   Patient denies any chest pain shortness of breath or abdominal pain  She states that the pain in her right leg is improving and the redness swelling is decreasing  Still noted to have 3 infected wounds on her leg which need debridement    Objective:     Vitals:   Temp (24hrs), Av 6 °F (36 4 °C), Min:97 3 °F (36 3 °C), Max:98 1 °F (36 7 °C)    Temp:  [97 3 °F (36 3 °C)-98 1 °F (36 7 °C)] 97 6 °F (36 4 °C)  HR:  [] 82  Resp:  [18-20] 19  BP: (121-137)/() 121/76  SpO2:  [96 %-99 %] 98 %  Body mass index is 38 62 kg/m²  Input and Output Summary (last 24 hours): Intake/Output Summary (Last 24 hours) at 2020 0949  Last data filed at 2020 1080  Gross per 24 hour   Intake 1290 ml   Output --   Net 1290 ml       Physical Exam:     Physical Exam   Constitutional: She is oriented to person, place, and time  She appears well-developed and well-nourished  HENT:   Head: Normocephalic and atraumatic  Right Ear: External ear normal    Left Ear: External ear normal    Mouth/Throat: Oropharynx is clear and moist    Eyes: Pupils are equal, round, and reactive to light  Conjunctivae and EOM are normal    Neck: Normal range of motion  Neck supple  Cardiovascular: Normal rate, regular rhythm and normal heart sounds  Pulmonary/Chest: Effort normal and breath sounds normal    Abdominal: Soft  Bowel sounds are normal  She exhibits no mass  There is no tenderness  There is no rebound and no guarding  Genitourinary:   Genitourinary Comments: deferred   Musculoskeletal: Normal range of motion  Right leg with 3 infected wounds noted with All 3 wounds measured together in the flow sheet as 8 x 2 3 x 0 5     Sloughed did not infected dead tissue noted on the wound beds  Loss of sensation noted on the right shin area   Neurological: She is alert and oriented to person, place, and time   She has normal reflexes  Cranial nerves 2-12 are normal   Normal neurological exam   Skin: Skin is warm and dry  No rash noted  Psychiatric: She has a normal mood and affect  Nursing note and vitals reviewed  Additional Data:     Labs:    Results from last 7 days   Lab Units 07/22/20  0439 07/21/20  1229   WBC Thousand/uL 9 32 11 91*   HEMOGLOBIN g/dL 13 8 14 7   HEMATOCRIT % 43 2 44 5   PLATELETS Thousands/uL 297 345   NEUTROS PCT %  --  64   LYMPHS PCT %  --  27   MONOS PCT %  --  6   EOS PCT %  --  2     Results from last 7 days   Lab Units 07/22/20  0439 07/21/20  1229   SODIUM mmol/L 140 139   POTASSIUM mmol/L 4 0 4 2   CHLORIDE mmol/L 106 104   CO2 mmol/L 27 25   BUN mg/dL 8 8   CREATININE mg/dL 0 81 0 87   ANION GAP mmol/L 7 10   CALCIUM mg/dL 8 3 8 9   ALBUMIN g/dL  --  3 7   TOTAL BILIRUBIN mg/dL  --  0 26   ALK PHOS U/L  --  129*   ALT U/L  --  44   AST U/L  --  20   GLUCOSE RANDOM mg/dL 91 89                 Results from last 7 days   Lab Units 07/21/20  1326   LACTIC ACID mmol/L 0 7           * I Have Reviewed All Lab Data Listed Above  * Additional Pertinent Lab Tests Reviewed: Jair 65 Admission Reviewed    Imaging:    Imaging Reports Reviewed Today Include:  None  Imaging Personally Reviewed by Myself Includes:  None    Recent Cultures (last 7 days):     Results from last 7 days   Lab Units 07/21/20  1229   BLOOD CULTURE  Received in Microbiology Lab  Culture in Progress  Received in Microbiology Lab  Culture in Progress     GRAM STAIN RESULT  No polys seen*  2+ Gram positive cocci in pairs*  1+ Gram negative rods*       Last 24 Hours Medication List:     Current Facility-Administered Medications:  acetaminophen 650 mg Oral Q6H PRN Oscar Wright MD    cefepime 2,000 mg Intravenous Q12H Oscar Wright MD Last Rate: 2,000 mg (07/22/20 0305)   nicotine 1 patch Transdermal Daily Oscar Wright MD    ondansetron 4 mg Intravenous Q6H PRN Oscar Wright MD    polyethylene glycol 17 g Oral Daily PRN Yris Dewitt MD    vancomycin 1,000 mg Intravenous Q8H Yris Dewitt MD Last Rate: 1,000 mg (07/22/20 0501)        Today, Patient Was Seen By: Yris Dewitt MD    ** Please Note: Dictation voice to text software may have been used in the creation of this document   **

## 2020-07-22 NOTE — PLAN OF CARE
Problem: PAIN - ADULT  Goal: Verbalizes/displays adequate comfort level or baseline comfort level  Description  Interventions:  - Encourage patient to monitor pain and request assistance  - Assess pain using appropriate pain scale  - Administer analgesics based on type and severity of pain and evaluate response  - Implement non-pharmacological measures as appropriate and evaluate response  - Consider cultural and social influences on pain and pain management  - Notify physician/advanced practitioner if interventions unsuccessful or patient reports new pain  Outcome: Progressing     Problem: INFECTION - ADULT  Goal: Absence or prevention of progression during hospitalization  Description  INTERVENTIONS:  - Assess and monitor for signs and symptoms of infection  - Monitor lab/diagnostic results  - Monitor all insertion sites, i e  indwelling lines, tubes, and drains  - Monitor endotracheal if appropriate and nasal secretions for changes in amount and color  - [place] appropriate cooling/warming therapies per order  - Administer medications as ordered  - Instruct and encourage patient and family to use good hand hygiene technique  - Identify and instruct in appropriate isolation precautions for identified infection/condition  Outcome: Progressing     Problem: SAFETY ADULT  Goal: Patient will remain free of falls  Description  INTERVENTIONS:  - Assess patient frequently for physical needs  -  Identify cognitive and physical deficits and behaviors that affect risk of falls    -  [place] fall precautions as indicated by assessment   - Educate patient/family on patient safety including physical limitations  - Instruct patient to call for assistance with activity based on assessment  - Modify environment to reduce risk of injury  - Consider OT/PT consult to assist with strengthening/mobility  Outcome: Progressing  Goal: Maintain or return to baseline ADL function  Description  INTERVENTIONS:  -  Assess patient's ability to carry out ADLs; assess patient's baseline for ADL function and identify physical deficits which impact ability to perform ADLs (bathing, care of mouth/teeth, toileting, grooming, dressing, etc )  - Assess/evaluate cause of self-care deficits   - Assess range of motion  - Assess patient's mobility; develop plan if impaired  - Assess patient's need for assistive devices and provide as appropriate  - Encourage maximum independence but intervene and supervise when necessary  - Involve family in performance of ADLs  - Assess for home care needs following discharge   - Consider OT consult to assist with ADL evaluation and planning for discharge  - Provide patient education as appropriate  Outcome: Progressing  Goal: Maintain or return mobility status to optimal level  Description  INTERVENTIONS:  - Assess patient's baseline mobility status (ambulation, transfers, stairs, etc )    - Identify cognitive and physical deficits and behaviors that affect mobility  - Identify mobility aids required to assist with transfers and/or ambulation (gait belt, sit-to-stand, lift, walker, cane, etc )  - [place] fall precautions as indicated by assessment  - Record patient progress and toleration of activity level on Mobility SBAR; progress patient to next Phase/Stage  - Instruct patient to call for assistance with activity based on assessment  - Consider rehabilitation consult to assist with strengthening/weightbearing, etc   Outcome: Progressing     Problem: DISCHARGE PLANNING  Goal: Discharge to home or other facility with appropriate resources  Description  INTERVENTIONS:  - Identify barriers to discharge w/patient and caregiver  - Arrange for needed discharge resources and transportation as appropriate  - Identify discharge learning needs (meds, wound care, etc )  - Arrange for interpretive services to assist at discharge as needed  - Refer to Case Management Department for coordinating discharge planning if the patient needs post-hospital services based on physician/advanced practitioner order or complex needs related to functional status, cognitive ability, or social support system  Outcome: Progressing     Problem: SKIN/TISSUE INTEGRITY - ADULT  Goal: Skin integrity remains intact  Description  INTERVENTIONS  - Identify patients at risk for skin breakdown  - Assess and monitor skin integrity  - Assess and monitor nutrition and hydration status  - Monitor labs (i e  albumin)  - Assess for incontinence   - Turn and reposition patient  - Assist with mobility/ambulation  - Relieve pressure over bony prominences  - Avoid friction and shearing  - Provide appropriate hygiene as needed including keeping skin clean and dry  - Evaluate need for skin moisturizer/barrier cream  - Collaborate with interdisciplinary team (i e  Nutrition, Rehabilitation, etc )   - Patient/family teaching  Outcome: Progressing  Goal: Incision(s), wounds(s) or drain site(s) healing without S/S of infection  Description  INTERVENTIONS  - Assess and document risk factors for skin impairment   - Assess and document dressing, incision, wound bed, drain sites and surrounding tissue  - Consider nutrition services referral as needed  - Oral mucous membranes remain intact  - Provide patient/ family education  Outcome: Progressing     Problem: Knowledge Deficit  Goal: Patient/family/caregiver demonstrates understanding of disease process, treatment plan, medications, and discharge instructions  Description  Complete learning assessment and assess knowledge base    Interventions:  - Provide teaching at level of understanding  - Provide teaching via preferred learning methods  Outcome: Progressing

## 2020-07-22 NOTE — UTILIZATION REVIEW
Initial Clinical Review    Admission: Date/Time/Statement: Admission Orders (From admission, onward)     Ordered        07/21/20 1355  Inpatient Admission  Once                   Orders Placed This Encounter   Procedures    Inpatient Admission     Standing Status:   Standing     Number of Occurrences:   1     Order Specific Question:   Admitting Physician     Answer:   Mellissa Pearson [[id number]]     Order Specific Question:   Level of Care     Answer:   Med Surg [16]     Order Specific Question:   Estimated length of stay     Answer:   More than 2 Midnights     Order Specific Question:   Certification     Answer:   I certify that inpatient services are medically necessary for this patient for a duration of greater than two midnights  See H&P and MD Progress Notes for additional information about the patient's course of treatment  ED Arrival Information     Expected Arrival Acuity Means of Arrival Escorted By Service Admission Type    - 7/21/2020 11:41 Urgent Walk-In Self Hospitalist Urgent    Arrival Complaint    Right leg pain         Chief Complaint   Patient presents with    Wound Infection     Pt [place] 2 [place] she hit her leg on something and since then she developed a wound on her R ankle that will not heal  Pt denies seeing anyone for this issue     Assessment/Plan: 21year old female to the ED from home with complaints of open wound  Initial injury was 2 months prior, area was reddened and swollen, then opened up and is draining now  She initially scratched her leg on a rock while [place]  ADmitted to inpatient for cellulitis of right lower leg, wound infection  She took 2 week course of amoxicillin at home and maintained clean wound  Area is worse  SHe has sensory loss to her entire right shin  Surgery consult           7/21 Wound care consult: Right lateral anterior lower leg with 23 full thickness wound bed openings    Proximal wound measuring 1 6 x 1 4 x 0 2 , center wound bed measuring 2 4 x 1 7 x 0 5 and the distal  wound bed measuring 2 7 x 2 5 x x 0 3   All 3 wounds measured together in the flow sheet as 8 x 2 3 x 0 5 No sensation of the area when probed for the depth   Center wound bed has small drainage when palpation   Cleanse with normal saline then apply silvasorb gel and top with adaptic      7/22 UPdate:  WOund cultures pending  IV abx    7/22 Gen surg consult: 3 open wound to right lateral shin appearing infected  Plan for bedside debridement later today  Continue IV abx    7/22 Gen surg update: The area was cleansed with Betadine prep in a sterile fashion  Then using a sterile forceps and scissors the necrotic tissue was gently and bluntly debrided from the area  The lower 2 wounds have exposed muscle that appears to be purplish in color and partially viable  The upper wound is pink and viable after debridement   Continue IV ab  ED Triage Vitals [07/21/20 1205]   Temperature Pulse Respirations Blood Pressure SpO2   (!) 97 3 °F (36 3 °C) (!) 115 20 137/76 98 %      Temp Source Heart Rate Source Patient Position - Orthostatic VS BP Location FiO2 (%)   Temporal Monitor Lying Right arm --      Pain Score       3        Wt Readings from Last 1 Encounters:   07/21/20 98 9 kg (218 lb 0 6 oz)     Additional Vital Signs:   Time  Temp  Pulse  Resp  BP  MAP (mmHg)  SpO2  O2 Device  Patient Position - Orthostatic VS   07/22/20 0750  --  --  --  --  --  98 %  None (Room air)  --   07/22/20 07:39:41  --  82  --  121/76  91  99 %  --  --   07/22/20 07:03:58  97 6 °F (36 4 °C)  87  19  132/100  111  99 %  --  --   07/21/20 23:23:17  98 1 °F (36 7 °C)  98  18  129/97  108  97 %  --  --   07/21/20 2100  --  --  --  --  --  --  None (Room air)  --   07/21/20 17:14:37  --  93  --  131/103Abnormal   112  97 %  --  --   07/21/20 14:43:54  97 3 °F (36 3 °C)Abnormal   87  20  135/108Abnormal   117  98 %  --  --   07/21/20 1430  --  95  18  134/93  --  98 %  -- --   07/21/20 1400  --  83  --  134/93  109  96 %  --  --   07/21/20 1330  --  98  --  133/93  --  96 %  --  --   07/21/20 1215  --  106Abnormal   18  137/76             Pertinent Labs/Diagnostic Test Results:         Results from last 7 days   Lab Units 07/22/20  0439 07/21/20  1229   WBC Thousand/uL 9 32 11 91*   HEMOGLOBIN g/dL 13 8 14 7   HEMATOCRIT % 43 2 44 5   PLATELETS Thousands/uL 297 345   NEUTROS ABS Thousands/µL  --  7 69*         Results from last 7 days   Lab Units 07/22/20  0439 07/21/20  1229   SODIUM mmol/L 140 139   POTASSIUM mmol/L 4 0 4 2   CHLORIDE mmol/L 106 104   CO2 mmol/L 27 25   ANION GAP mmol/L 7 10   BUN mg/dL 8 8   CREATININE mg/dL 0 81 0 87   EGFR ml/min/1 73sq m 101 93   CALCIUM mg/dL 8 3 8 9     Results from last 7 days   Lab Units 07/21/20  1229   AST U/L 20   ALT U/L 44   ALK PHOS U/L 129*   TOTAL PROTEIN g/dL 7 6   ALBUMIN g/dL 3 7   TOTAL BILIRUBIN mg/dL 0 26         Results from last 7 days   Lab Units 07/22/20  0439 07/21/20  1229   GLUCOSE RANDOM mg/dL 91 89       Results from last 7 days   Lab Units 07/22/20  0439   TSH 3RD GENERATON uIU/mL 1 475         Results from last 7 days   Lab Units 07/21/20  1326   LACTIC ACID mmol/L 0 7         Results from last 7 days   Lab Units 07/21/20  1229   BLOOD CULTURE  Received in Microbiology Lab  Culture in Progress  Received in Microbiology Lab  Culture in Progress     GRAM STAIN RESULT  No polys seen*  2+ Gram positive cocci in pairs*  1+ Gram negative rods*       ED Treatment:   Medication Administration from 07/21/2020 1139 to 07/21/2020 1437       Date/Time Order Dose Route Action     07/21/2020 1237 sodium chloride 0 9 % bolus 1,000 mL 1,000 mL Intravenous New Bag     07/21/2020 1238 vancomycin (VANCOCIN) 1,500 mg in sodium chloride 0 9 % 250 mL IVPB 1,500 mg Intravenous New Bag          Admitting Diagnosis: Leg pain [M79 606]  Cellulitis of right lower leg [L03 115]  Age/Sex: 21 y o  female  Admission Orders:  WOund care  Up and OOB  Anaerobic culture and gram stain  Scheduled Medications:    Medications:  cefepime 2,000 mg Intravenous Q12H   nicotine 1 patch Transdermal Daily   vancomycin 1,000 mg Intravenous Q8H     Continuous IV Infusions:     PRN Meds:    acetaminophen 650 mg Oral Q6H PRN   ondansetron 4 mg Intravenous Q6H PRN   polyethylene glycol 17 g Oral Daily PRN       IP CONSULT TO WOUND CARE  IP CONSULT TO PHARMACY  IP CONSULT TO CASE MANAGEMENT  IP CONSULT TO ACUTE CARE SURGERY    Network Utilization Review Department  [email or web address]  ATTENTION: Please call with any questions or concerns to [phone] and carefully listen to the prompts so that you are directed to the right person  All voicemails are confidential   Tre Zenaida all requests for admission clinical reviews, approved or denied determinations and any other requests to dedicated fax number below belonging to the campus where the patient is receiving treatment   List of dedicated fax numbers for the Facilities:  [address] DENIALS (Administrative/Medical Necessity) [phone]   [address] (Maternity/NICU/Pediatrics) [phone]   Todd Fenton [phone]   Ali[facility] [phone]   Lindsay Case [phone]   145 [facility]  [phone]   Ana Bolden 15[address] [facility] [phone]   [facility] [phone] [address] [facility] [address]  [address] [facility] [address] [facility] [phone]

## 2020-07-22 NOTE — PROGRESS NOTES
Vancomycin Assessment    Dru Vallejo is a 21 y o  female who is currently receiving vancomycin 1000 mg iv q 8 hrs for skin-soft tissue infection     Relevant clinical data and objective history reviewed:  Creatinine   Date Value Ref Range Status   07/22/2020 0 81 0 60 - 1 30 mg/dL Final     Comment:     Standardized to IDMS reference method   07/21/2020 0 87 0 60 - 1 30 mg/dL Final     Comment:     Standardized to IDMS reference method     /76   Pulse 82   Temp 97 6 °F (36 4 °C)   Resp 19   Ht 5' 3" (1 6 m)   Wt 98 9 kg (218 lb 0 6 oz)   LMP 07/07/2020   SpO2 98%   BMI 38 62 kg/m²   I/O last 3 completed shifts: In: 1050 [IV Piggyback:1050]  Out: -   Lab Results   Component Value Date/Time    BUN 8 07/22/2020 04:39 AM    WBC 9 32 07/22/2020 04:39 AM    HGB 13 8 07/22/2020 04:39 AM    HCT 43 2 07/22/2020 04:39 AM    MCV 93 07/22/2020 04:39 AM     07/22/2020 04:39 AM     Temp Readings from Last 3 Encounters:   07/22/20 97 6 °F (36 4 °C)     Vancomycin Days of Therapy: 2    Assessment/Plan  The patient is currently on vancomycin utilizing scheduled dosing  Baseline risks associated with therapy include: concomitant nephrotoxic medications  The patient is receiving 1000 mg iv q 8 hrs with the most recent vancomycin level being at steady-state and sub-therapeutic based on a goal of 15-20 (appropriate for most indications) ; therefore, after clinical evaluation will be changed to 1250 mg iv q 8 hrs   Pharmacy will continue to follow closely for s/sx of nephrotoxicity, infusion reactions and appropriateness of therapy  BMP and CBC will be ordered per protocol  Plan for trough as patient approaches steady state, prior to the 4th  dose at approximately 1100 on 7/23/20  Pharmacy will continue to follow the patients culture results and clinical progress daily      Christel Salinas, Pharmacist

## 2020-07-22 NOTE — ASSESSMENT & PLAN NOTE
Patient noted to have a cellulitis on the right lower extremity at the site of recent injury after a fall which was 2 months ago  She [place] close to [place] and her leg hit a rock  Patient also has 3 infected wounds on the leg as well  Wound cultures done  Patient has already used 2 weeks of amoxicillin at home with failure of treatment  Will place on IV vancomycin and cefepime for now  Discussed with wound care and surgery    Plan for bedside debridement with surgery today

## 2020-07-22 NOTE — PLAN OF CARE
Problem: INFECTION - ADULT  Goal: Absence or prevention of progression during hospitalization  Description  INTERVENTIONS:  - Assess and monitor for signs and symptoms of infection  - Monitor lab/diagnostic results  - Monitor all insertion sites, i e  indwelling lines, tubes, and drains  - Monitor endotracheal if appropriate and nasal secretions for changes in amount and color  - [place] appropriate cooling/warming therapies per order  - Administer medications as ordered  - Instruct and encourage patient and family to use good hand hygiene technique  - Identify and instruct in appropriate isolation precautions for identified infection/condition  Outcome: Not Progressing     Problem: SAFETY ADULT  Goal: Patient will remain free of falls  Description  INTERVENTIONS:  - Assess patient frequently for physical needs  -  Identify cognitive and physical deficits and behaviors that affect risk of falls    -  [place] fall precautions as indicated by assessment   - Educate patient/family on patient safety including physical limitations  - Instruct patient to call for assistance with activity based on assessment  - Modify environment to reduce risk of injury  - Consider OT/PT consult to assist with strengthening/mobility  Outcome: Not Progressing     Problem: SAFETY ADULT  Goal: Maintain or return to baseline ADL function  Description  INTERVENTIONS:  -  Assess patient's ability to carry out ADLs; assess patient's baseline for ADL function and identify physical deficits which impact ability to perform ADLs (bathing, care of mouth/teeth, toileting, grooming, dressing, etc )  - Assess/evaluate cause of self-care deficits   - Assess range of motion  - Assess patient's mobility; develop plan if impaired  - Assess patient's need for assistive devices and provide as appropriate  - Encourage maximum independence but intervene and supervise when necessary  - Involve family in performance of ADLs  - Assess for home care needs following discharge   - Consider OT consult to assist with ADL evaluation and planning for discharge  - Provide patient education as appropriate  Outcome: Not Progressing     Problem: SAFETY ADULT  Goal: Maintain or return mobility status to optimal level  Description  INTERVENTIONS:  - Assess patient's baseline mobility status (ambulation, transfers, stairs, etc )    - Identify cognitive and physical deficits and behaviors that affect mobility  - Identify mobility aids required to assist with transfers and/or ambulation (gait belt, sit-to-stand, lift, walker, cane, etc )  - [place] fall precautions as indicated by assessment  - Record patient progress and toleration of activity level on Mobility SBAR; progress patient to next Phase/Stage  - Instruct patient to call for assistance with activity based on assessment  - Consider rehabilitation consult to assist with strengthening/weightbearing, etc   Outcome: Not Progressing     Problem: DISCHARGE PLANNING  Goal: Discharge to home or other facility with appropriate resources  Description  INTERVENTIONS:  - Identify barriers to discharge w/patient and caregiver  - Arrange for needed discharge resources and transportation as appropriate  - Identify discharge learning needs (meds, wound care, etc )  - Arrange for interpretive services to assist at discharge as needed  - Refer to Case Management Department for coordinating discharge planning if the patient needs post-hospital services based on physician/advanced practitioner order or complex needs related to functional status, cognitive ability, or social support system  Outcome: Not Progressing     Problem: Knowledge Deficit  Goal: Patient/family/caregiver demonstrates understanding of disease process, treatment plan, medications, and discharge instructions  Description  Complete learning assessment and assess knowledge base    Interventions:  - Provide teaching at level of understanding  - Provide teaching via preferred learning methods  Outcome: Not Progressing     Problem: SKIN/TISSUE INTEGRITY - ADULT  Goal: Skin integrity remains intact  Description  INTERVENTIONS  - Identify patients at risk for skin breakdown  - Assess and monitor skin integrity  - Assess and monitor nutrition and hydration status  - Monitor labs (i e  albumin)  - Assess for incontinence   - Turn and reposition patient  - Assist with mobility/ambulation  - Relieve pressure over bony prominences  - Avoid friction and shearing  - Provide appropriate hygiene as needed including keeping skin clean and dry  - Evaluate need for skin moisturizer/barrier cream  - Collaborate with interdisciplinary team (i e  Nutrition, Rehabilitation, etc )   - Patient/family teaching  Outcome: Not Progressing     Problem: SKIN/TISSUE INTEGRITY - ADULT  Goal: Incision(s), wounds(s) or drain site(s) healing without S/S of infection  Description  INTERVENTIONS  - Assess and document risk factors for skin impairment   - Assess and document dressing, incision, wound bed, drain sites and surrounding tissue  - Consider nutrition services referral as needed  - Oral mucous membranes remain intact  - Provide patient/ family education  Outcome: Not Progressing

## 2020-07-22 NOTE — SOCIAL WORK
Current LOS 1 day  CM consult for Other  CM met with patient at the bedside,baseline information was obtained  CM discussed the role of CM in helping the patient develop a discharge plan and assist the patient in carry out their plan  Pt lives with her children and a friend in a 2 SH, 2 BRODY and 12 steps down to finished basement  Pt completes ADLs and IADLs independently  Pt ambulates independently  No DME  Pt drives  Pt is employed  Pt has no hx of STR or HHC  Pt denies hx of MH or D&A tx  PCP: Dr Vargas Slight  Preferred Pharmacy: [facility] [id number] in [address] [place] [address] Avenue: Geovanna Araya (friend/roommate) [phone]  No POA  No LW  Pt declined to name someone to speak on her behalf  CM explained [address] 169  Pt has friends that can transport at time of d/c  Pt hasn been referred to [facility] d/t no insurance  Pt will have bedside debridement on leg wound later today and plan to d/c home tomorrow  Wound care nurse recommending OP f/u with wound care  Pt requested somewhere close to where she lives  CM completed search  Closest wound care is [facility] in [place]  CM will inquiry if they will see pt with MA Pending  Otherwise, directed pt to contact and discuss self pay options/financial assistance prior to scheduling

## 2020-07-22 NOTE — ASSESSMENT & PLAN NOTE
Patient noted to have 3 wounds on her right shin area  Picture present in media files  For bedside debridement today  Continue wound care recommendations  Discussed with surgery    Continue IV antibiotics

## 2020-07-23 VITALS
DIASTOLIC BLOOD PRESSURE: 68 MMHG | HEART RATE: 87 BPM | SYSTOLIC BLOOD PRESSURE: 125 MMHG | TEMPERATURE: 98.3 F | BODY MASS INDEX: 38.63 KG/M2 | OXYGEN SATURATION: 98 % | WEIGHT: 218.03 LBS | HEIGHT: 63 IN | RESPIRATION RATE: 17 BRPM

## 2020-07-23 PROBLEM — R78.81 BACTEREMIA DUE TO GRAM-POSITIVE BACTERIA: Status: ACTIVE | Noted: 2020-07-23

## 2020-07-23 LAB
BACTERIA WND AEROBE CULT: ABNORMAL
BACTERIA WND AEROBE CULT: ABNORMAL
BASOPHILS # BLD AUTO: 0.06 THOUSANDS/ΜL (ref 0–0.1)
BASOPHILS NFR BLD AUTO: 1 % (ref 0–1)
EOSINOPHIL # BLD AUTO: 0.2 THOUSAND/ΜL (ref 0–0.61)
EOSINOPHIL NFR BLD AUTO: 2 % (ref 0–6)
ERYTHROCYTE [DISTWIDTH] IN BLOOD BY AUTOMATED COUNT: 13 % (ref 11.6–15.1)
GRAM STN SPEC: ABNORMAL
HCT VFR BLD AUTO: 45.3 % (ref 34.8–46.1)
HGB BLD-MCNC: 14.7 G/DL (ref 11.5–15.4)
IMM GRANULOCYTES # BLD AUTO: 0.02 THOUSAND/UL (ref 0–0.2)
IMM GRANULOCYTES NFR BLD AUTO: 0 % (ref 0–2)
LYMPHOCYTES # BLD AUTO: 2.38 THOUSANDS/ΜL (ref 0.6–4.47)
LYMPHOCYTES NFR BLD AUTO: 24 % (ref 14–44)
MCH RBC QN AUTO: 30.2 PG (ref 26.8–34.3)
MCHC RBC AUTO-ENTMCNC: 32.5 G/DL (ref 31.4–37.4)
MCV RBC AUTO: 93 FL (ref 82–98)
MONOCYTES # BLD AUTO: 0.61 THOUSAND/ΜL (ref 0.17–1.22)
MONOCYTES NFR BLD AUTO: 6 % (ref 4–12)
NEUTROPHILS # BLD AUTO: 6.55 THOUSANDS/ΜL (ref 1.85–7.62)
NEUTS SEG NFR BLD AUTO: 67 % (ref 43–75)
NRBC BLD AUTO-RTO: 0 /100 WBCS
PLATELET # BLD AUTO: 321 THOUSANDS/UL (ref 149–390)
PMV BLD AUTO: 9.7 FL (ref 8.9–12.7)
RBC # BLD AUTO: 4.86 MILLION/UL (ref 3.81–5.12)
VANCOMYCIN TROUGH SERPL-MCNC: 18.9 UG/ML (ref 10–20)
WBC # BLD AUTO: 9.82 THOUSAND/UL (ref 4.31–10.16)

## 2020-07-23 PROCEDURE — 85025 COMPLETE CBC W/AUTO DIFF WBC: CPT | Performed by: PHYSICIAN ASSISTANT

## 2020-07-23 PROCEDURE — 99239 HOSP IP/OBS DSCHRG MGMT >30: CPT | Performed by: FAMILY MEDICINE

## 2020-07-23 PROCEDURE — 80202 ASSAY OF VANCOMYCIN: CPT | Performed by: FAMILY MEDICINE

## 2020-07-23 RX ORDER — LEVOFLOXACIN 500 MG/1
500 TABLET, FILM COATED ORAL EVERY 24 HOURS
Qty: 7 TABLET | Refills: 0 | Status: SHIPPED | OUTPATIENT
Start: 2020-07-23 | End: 2020-07-30

## 2020-07-23 RX ADMIN — CEFEPIME HYDROCHLORIDE 2000 MG: 2 INJECTION, SOLUTION INTRAVENOUS at 04:37

## 2020-07-23 RX ADMIN — VANCOMYCIN HYDROCHLORIDE 1250 MG: 5 INJECTION, POWDER, LYOPHILIZED, FOR SOLUTION INTRAVENOUS at 04:32

## 2020-07-23 NOTE — PROGRESS NOTES
Progress Note - General Surgery   Will Ganong 21 y o  female MRN: [id number]  Unit/Bed#: -01 Encounter: [id number]    Assessment:  Post procedure day 1, debridement of open wounds to right lateral shin  Afebrile  Tobacco abuse      Plan:  - Stable for discharge from surgical perspective  - Transition to PO antibiotics for discharge  - Right leg wounds: cleanse with Normal saline then apply silvasorb gel then top with adaptic then a gauze then a ABD and esther wrap change daily   - Elevate right lower leg while supine or in bed  - Recommend smoking cessation  - Follow up in clinic with Dr Mir Davidson 2 weeks after discharge  - Follow up with wound care clinic upon discharge      Subjective/Objective     Subjective: No acute events overnight  Denies fevers/chills  Denies pain  Has been OOB ambulating without issue  Objective:     Blood pressure 125/68, pulse 87, temperature 98 3 °F (36 8 °C), resp  rate 17, height 5' 3" (1 6 m), weight 98 9 kg (218 lb 0 6 oz), last menstrual period 07/07/2020, SpO2 98 %  ,Body mass index is 38 62 kg/m²  Intake/Output Summary (Last 24 hours) at 7/23/2020 0824  Last data filed at 7/23/2020 1797  Gross per 24 hour   Intake 460 ml   Output --   Net 460 ml       Invasive Devices     Peripheral Intravenous Line            Peripheral IV 07/22/20 Left Hand less than 1 day                Physical Exam:   Gen: AxOx3  Heart: RRR  Lungs: CTA  Ext: wounds appear pink and viable, erythema to surrounding tissue is improving, no purulence or foul odor of wounds  Lab, Imaging and other studies:I have personally reviewed pertinent lab results            Lázaro Villar PA-C

## 2020-07-23 NOTE — NURSING NOTE
Peripheral IV removed  Belongings sent home with pt  Discharge AVS printed and reviewed with pt  Script sent electronically to pharmacy  Wound care supplies sent with pt for one time change  Pt made aware that she is allowed to shower at home per Dr Lul Guajardo  Personal contact information verified with pt for follow-up phone call regarding cultures that are pending  Preferred phone # is [phone] for [facility]

## 2020-07-23 NOTE — PLAN OF CARE
Problem: PAIN - ADULT  Goal: Verbalizes/displays adequate comfort level or baseline comfort level  Description  Interventions:  - Encourage patient to monitor pain and request assistance  - Assess pain using appropriate pain scale  - Administer analgesics based on type and severity of pain and evaluate response  - Implement non-pharmacological measures as appropriate and evaluate response  - Consider cultural and social influences on pain and pain management  - Notify physician/advanced practitioner if interventions unsuccessful or patient reports new pain  Outcome: Progressing     Problem: INFECTION - ADULT  Goal: Absence or prevention of progression during hospitalization  Description  INTERVENTIONS:  - Assess and monitor for signs and symptoms of infection  - Monitor lab/diagnostic results  - Monitor all insertion sites, i e  indwelling lines, tubes, and drains  - Monitor endotracheal if appropriate and nasal secretions for changes in amount and color  - [place] appropriate cooling/warming therapies per order  - Administer medications as ordered  - Instruct and encourage patient and family to use good hand hygiene technique  - Identify and instruct in appropriate isolation precautions for identified infection/condition  Outcome: Progressing     Problem: SAFETY ADULT  Goal: Patient will remain free of falls  Description  INTERVENTIONS:  - Assess patient frequently for physical needs  -  Identify cognitive and physical deficits and behaviors that affect risk of falls    -  [place] fall precautions as indicated by assessment   - Educate patient/family on patient safety including physical limitations  - Instruct patient to call for assistance with activity based on assessment  - Modify environment to reduce risk of injury  - Consider OT/PT consult to assist with strengthening/mobility  Outcome: Progressing  Goal: Maintain or return to baseline ADL function  Description  INTERVENTIONS:  -  Assess patient's ability to carry out ADLs; assess patient's baseline for ADL function and identify physical deficits which impact ability to perform ADLs (bathing, care of mouth/teeth, toileting, grooming, dressing, etc )  - Assess/evaluate cause of self-care deficits   - Assess range of motion  - Assess patient's mobility; develop plan if impaired  - Assess patient's need for assistive devices and provide as appropriate  - Encourage maximum independence but intervene and supervise when necessary  - Involve family in performance of ADLs  - Assess for home care needs following discharge   - Consider OT consult to assist with ADL evaluation and planning for discharge  - Provide patient education as appropriate  Outcome: Progressing  Goal: Maintain or return mobility status to optimal level  Description  INTERVENTIONS:  - Assess patient's baseline mobility status (ambulation, transfers, stairs, etc )    - Identify cognitive and physical deficits and behaviors that affect mobility  - Identify mobility aids required to assist with transfers and/or ambulation (gait belt, sit-to-stand, lift, walker, cane, etc )  - [place] fall precautions as indicated by assessment  - Record patient progress and toleration of activity level on Mobility SBAR; progress patient to next Phase/Stage  - Instruct patient to call for assistance with activity based on assessment  - Consider rehabilitation consult to assist with strengthening/weightbearing, etc   Outcome: Progressing     Problem: DISCHARGE PLANNING  Goal: Discharge to home or other facility with appropriate resources  Description  INTERVENTIONS:  - Identify barriers to discharge w/patient and caregiver  - Arrange for needed discharge resources and transportation as appropriate  - Identify discharge learning needs (meds, wound care, etc )  - Arrange for interpretive services to assist at discharge as needed  - Refer to Case Management Department for coordinating discharge planning if the patient needs post-hospital services based on physician/advanced practitioner order or complex needs related to functional status, cognitive ability, or social support system  Outcome: Progressing     Problem: Knowledge Deficit  Goal: Patient/family/caregiver demonstrates understanding of disease process, treatment plan, medications, and discharge instructions  Description  Complete learning assessment and assess knowledge base    Interventions:  - Provide teaching at level of understanding  - Provide teaching via preferred learning methods  Outcome: Progressing     Problem: SKIN/TISSUE INTEGRITY - ADULT  Goal: Skin integrity remains intact  Description  INTERVENTIONS  - Identify patients at risk for skin breakdown  - Assess and monitor skin integrity  - Assess and monitor nutrition and hydration status  - Monitor labs (i e  albumin)  - Assess for incontinence   - Turn and reposition patient  - Assist with mobility/ambulation  - Relieve pressure over bony prominences  - Avoid friction and shearing  - Provide appropriate hygiene as needed including keeping skin clean and dry  - Evaluate need for skin moisturizer/barrier cream  - Collaborate with interdisciplinary team (i e  Nutrition, Rehabilitation, etc )   - Patient/family teaching  Outcome: Progressing  Goal: Incision(s), wounds(s) or drain site(s) healing without S/S of infection  Description  INTERVENTIONS  - Assess and document risk factors for skin impairment   - Assess and document dressing, incision, wound bed, drain sites and surrounding tissue  - Consider nutrition services referral as needed  - Oral mucous membranes remain intact  - Provide patient/ family education  Outcome: Progressing

## 2020-07-23 NOTE — DISCHARGE SUMMARY
Discharge- Mary Varsha 1995, 21 y o  female MRN: [id number]    Unit/Bed#: -01 Encounter: [id number]    Primary Care Provider: Sarina Taylor MD   Date and time admitted to hospital: 7/21/2020 11:59 AM        * Cellulitis of right lower leg  Assessment & Plan  Patient noted to have a cellulitis on the right lower extremity at the site of recent injury after a fall which was 2 months ago  [place] she [place] a [place]  Patient noted to have a contusion on the right lower extremity with muscle necrosis noted  She then developed 3 infected wounds on the leg as well as surrounding cellulitis  Wound cultures done  Polymicrobial also growing Morganella morgananii  Patient has already used 2 weeks of amoxicillin at home with failure of treatment    Treated with IV Vanco and cefepime while in the hospital   Seen by surgery and had bedside debridement of the wounds done and is now looking much better  Will discharge her home on a 7 day course of Levaquin with outpatient follow-up with surgery and wound care  Wound infection  Assessment & Plan  Patient noted to have 3 wounds on her right shin area  Picture present in media files  bedside debridement 7/22  Continue wound care recommendations  Discussed with surgery  Transition to oral antibiotics    Bacteremia due to Gram-positive bacteria  Assessment & Plan  Discussed with microbiology  Possible contamination      Tobacco abuse  Assessment & Plan  Counseled on smoking cessation and placed on nicotine replacement therapy      Discharging Physician / Practitioner: Christian Rocha MD  PCP: Sarina Taylor MD  Admission Date:   Admission Orders (From admission, onward)     Ordered        07/21/20 1355  Inpatient Admission  Once                   Discharge Date: 07/23/20    Resolved Problems  Date Reviewed: 7/23/2020    None          Consultations During Hospital Stay:  · Surgery and wound care    Procedures Performed:   · Bedside debridement of infected wounds and necrotic muscle tissue on the right leg    Significant Findings / Test Results:   No results found  Incidental Findings:   · None     Test Results Pending at Discharge (will require follow up): · Blood cultures and wound culture final report     Outpatient Tests Requested:  · None    Complications:  None    Reason for Admission:  Right leg infected wounds and cellulitis    Hospital Course:     Yessica Birmingham is a 21 y o  female patient who originally presented to the hospital on 7/21/2020 due to right leg infected wounds and cellulitis  Patient states that 2 months ago she was [place] and she fell and her leg hit against a stone  She had an indentation and contusion of her leg and finally it opened up into 3 wounds which were draining purulent material   She took a 2 week course of amoxicillin and cleaned it with hydrogen peroxide at home with no improvement and came to the emergency room  Found to have polymicrobial infection and seen by wound care and surgery and bedside debridement of the wound done and also some muscle necrosis noted on the wound bed  Initially treated with IV Vanco and cefepime  Now being transition to a 7 day course of oral Levaquin with outpatient follow-up with surgery and wound care  Please note patient did have Gram-positive rods 1/2 bottles of cultures done from the ER which is pending at this time  Please see above list of diagnoses and related plan for additional information  Condition at Discharge: good     Discharge Day Visit / Exam:     Subjective:  Patient states she feels much better now    She has no pain or feeling on the right leg wound but denies any fevers or chills and also feels like the cleaning up of the wound by surgery helped  Vitals: Blood Pressure: 125/68 (07/23/20 0730)  Pulse: 87 (07/23/20 0730)  Temperature: 98 3 °F (36 8 °C) (07/23/20 0730)  Temp Source: Temporal (07/21/20 1205)  Respirations: 17 (07/23/20 0730)  Height: 5' 3" (160 cm) (07/21/20 1205)  Weight - Scale: 98 9 kg (218 lb 0 6 oz) (07/21/20 1205)  SpO2: 98 % (07/23/20 0730)  Exam:   Physical Exam   Constitutional: She is oriented to person, place, and time  She appears well-developed and well-nourished  HENT:   Head: Normocephalic and atraumatic  Right Ear: External ear normal    Left Ear: External ear normal    Mouth/Throat: Oropharynx is clear and moist    Eyes: Conjunctivae and EOM are normal    Neck: Normal range of motion  Neck supple  Cardiovascular: Normal rate, regular rhythm and normal heart sounds  Pulmonary/Chest: Effort normal and breath sounds normal    Abdominal: Soft  Bowel sounds are normal  She exhibits no mass  There is no tenderness  There is no rebound and no guarding  Genitourinary:   Genitourinary Comments: deferred   Musculoskeletal: Normal range of motion  Right leg redness decreasing  Induration still noted with 3 wounds with minimal serosanguineous drainage and pink healthy tissue noted on the wound bed   Neurological: She is alert and oriented to person, place, and time  She has normal reflexes  Cranial nerves 2-12 are normal   Normal neurological exam   Skin: Skin is warm and dry  No rash noted  Psychiatric: She has a normal mood and affect  Nursing note and vitals reviewed  Discussion with Family:  None    Discharge instructions/Information to patient and family:   See after visit summary for information provided to patient and family  Provisions for Follow-Up Care:  See after visit summary for information related to follow-up care and any pertinent home health orders  Disposition:     Home    For Discharges to [facility] SNF:   · Not Applicable to this Patient - Not Applicable to this Patient    Planned Readmission:  None     Discharge Statement:  I spent 35 minutes discharging the patient  This time was spent on the day of discharge   I had direct contact with the patient on the day of discharge  Greater than 50% of the total time was spent examining patient, answering all patient questions, arranging and discussing plan of care with patient as well as directly providing post-discharge instructions  Additional time then spent on discharge activities  Discharge Medications:  See after visit summary for reconciled discharge medications provided to patient and family        ** Please Note: This note has been constructed using a voice recognition system **

## 2020-07-23 NOTE — SOCIAL WORK
Pt for d/c today  Attending ordered Levoquin 7days  CM made call to Raad Tavares to price medication for 13 [phone], confirmed w/ Pt that she can purchase  Pt for d/c today to f/u with outpt woundcare

## 2020-07-23 NOTE — ASSESSMENT & PLAN NOTE
Patient noted to have a cellulitis on the right lower extremity at the site of recent injury after a fall which was 2 months ago  [place] she [place]  Patient noted to have a contusion on the right lower extremity with muscle necrosis noted  She then developed 3 infected wounds on the leg as well as surrounding cellulitis  Wound cultures done  Polymicrobial also growing Morganella morgananii  Patient has already used 2 weeks of amoxicillin at home with failure of treatment    Treated with IV Vanco and cefepime while in the hospital   Seen by surgery and had bedside debridement of the wounds done and is now looking much better  Will discharge her home on a 7 day course of Levaquin with outpatient follow-up with surgery and wound care

## 2020-07-23 NOTE — DISCHARGE INSTRUCTIONS
Debridement   WHAT YOU NEED TO KNOW:   Debridement is the removal of infected, damaged, or dead tissue so a wound can heal properly  You may need more than one debridement  DISCHARGE INSTRUCTIONS:   Medicines:   · Medicines  can help decrease pain or prevent or treat an infection  · Take your medicine as directed  Contact your healthcare provider if you think your medicine is not helping or if you have side effects  Tell him or her if you are allergic to any medicine  Keep a list of the medicines, vitamins, and herbs you take  Include the amounts, and when and why you take them  Bring the list or the pill bottles to follow-up visits  Carry your medicine list with you in case of an emergency  Follow up with your healthcare provider as directed: You may need to return to have your wound checked  Write down your questions so you remember to ask them during your visits  Care for your wound as directed:   · Keep your wound clean and dry  You may need to cover your wound when you bathe  · Limit movements,  such as stretching, to prevent bleeding, tearing, and swelling in your wound  · Protect your wound  Avoid sunlight for at least 6 months  Apply mild, unscented lotion or cream to the skin around your wound to keep it moist     · Do not smoke  If you smoke, it is never too late to quit  Smoking decreases blood flow to the wound and delays healing  Ask for information if you need help quitting  · Drink liquids as directed  Ask how much liquid to drink each day and which liquids are best for you  Liquids help keep your skin moist so your wound can heal      · Eat a variety of healthy foods  Foods rich in protein, such as meat, eggs, and dairy products, help repair tissue  Carbohydrate-rich foods, such as bread and cereals, help increase cell growth and decrease the risk for wound infection  Do not have caffeine  Ask if you should take vitamins   Vitamins A and C may help tissue formation and increase scar tissue strength  Contact your healthcare provider if:   · You have a fever  · Your pain gets worse or does not go away, even after treatment  · Your skin is red, swollen, or draining pus  · You have questions or concerns about your condition or care  Seek care immediately or call 911 if:   · Blood soaks through your bandage  · You have severe pain  © 2017 [facility] Information is for End User's use only and may not be sold, redistributed or otherwise used for commercial purposes  All illustrations and images included in CareNotes® are the copyrighted property of [employer]  or Nick Casiano  The above information is an  only  It is not intended as medical advice for individual conditions or treatments  Talk to your doctor, nurse or pharmacist before following any medical regimen to see if it is safe and effective for you

## 2020-07-23 NOTE — ASSESSMENT & PLAN NOTE
Patient noted to have 3 wounds on her right shin area  Picture present in media files  bedside debridement 7/22  Continue wound care recommendations  Discussed with surgery    Transition to oral antibiotics

## 2020-07-24 LAB — BACTERIA SPEC ANAEROBE CULT: ABNORMAL

## 2020-07-25 LAB
BACTERIA BLD CULT: ABNORMAL
GRAM STN SPEC: ABNORMAL

## 2020-07-26 LAB — BACTERIA BLD CULT: NORMAL

## 2020-08-10 ENCOUNTER — APPOINTMENT (OUTPATIENT)
Dept: LAB | Facility: HOSPITAL | Age: 25
End: 2020-08-10

## 2020-08-10 ENCOUNTER — ANESTHESIA EVENT (OUTPATIENT)
Dept: PERIOP | Facility: HOSPITAL | Age: 25
End: 2020-08-10

## 2020-08-10 ENCOUNTER — TRANSCRIBE ORDERS (OUTPATIENT)
Dept: ADMINISTRATIVE | Facility: HOSPITAL | Age: 25
End: 2020-08-10

## 2020-08-10 DIAGNOSIS — Z01.818 PREOP EXAMINATION: Primary | ICD-10-CM

## 2020-08-10 DIAGNOSIS — Z01.818 PREOP EXAMINATION: ICD-10-CM

## 2020-08-10 LAB
ANION GAP SERPL CALCULATED.3IONS-SCNC: 7 MMOL/L (ref 4–13)
BASOPHILS # BLD AUTO: 0.05 THOUSANDS/ΜL (ref 0–0.1)
BASOPHILS NFR BLD AUTO: 1 % (ref 0–1)
BUN SERPL-MCNC: 6 MG/DL (ref 5–25)
CALCIUM SERPL-MCNC: 9.4 MG/DL (ref 8.3–10.1)
CHLORIDE SERPL-SCNC: 105 MMOL/L (ref 100–108)
CO2 SERPL-SCNC: 28 MMOL/L (ref 21–32)
CREAT SERPL-MCNC: 0.83 MG/DL (ref 0.6–1.3)
EOSINOPHIL # BLD AUTO: 0.18 THOUSAND/ΜL (ref 0–0.61)
EOSINOPHIL NFR BLD AUTO: 2 % (ref 0–6)
ERYTHROCYTE [DISTWIDTH] IN BLOOD BY AUTOMATED COUNT: 12.5 % (ref 11.6–15.1)
GFR SERPL CREATININE-BSD FRML MDRD: 98 ML/MIN/1.73SQ M
GLUCOSE SERPL-MCNC: 98 MG/DL (ref 65–140)
HCT VFR BLD AUTO: 44.7 % (ref 34.8–46.1)
HGB BLD-MCNC: 14.4 G/DL (ref 11.5–15.4)
IMM GRANULOCYTES # BLD AUTO: 0.04 THOUSAND/UL (ref 0–0.2)
IMM GRANULOCYTES NFR BLD AUTO: 0 % (ref 0–2)
LYMPHOCYTES # BLD AUTO: 2.73 THOUSANDS/ΜL (ref 0.6–4.47)
LYMPHOCYTES NFR BLD AUTO: 27 % (ref 14–44)
MCH RBC QN AUTO: 30.4 PG (ref 26.8–34.3)
MCHC RBC AUTO-ENTMCNC: 32.2 G/DL (ref 31.4–37.4)
MCV RBC AUTO: 95 FL (ref 82–98)
MONOCYTES # BLD AUTO: 0.59 THOUSAND/ΜL (ref 0.17–1.22)
MONOCYTES NFR BLD AUTO: 6 % (ref 4–12)
NEUTROPHILS # BLD AUTO: 6.59 THOUSANDS/ΜL (ref 1.85–7.62)
NEUTS SEG NFR BLD AUTO: 64 % (ref 43–75)
NRBC BLD AUTO-RTO: 0 /100 WBCS
PLATELET # BLD AUTO: 376 THOUSANDS/UL (ref 149–390)
PMV BLD AUTO: 9.7 FL (ref 8.9–12.7)
POTASSIUM SERPL-SCNC: 4 MMOL/L (ref 3.5–5.3)
RBC # BLD AUTO: 4.73 MILLION/UL (ref 3.81–5.12)
SODIUM SERPL-SCNC: 140 MMOL/L (ref 136–145)
WBC # BLD AUTO: 10.18 THOUSAND/UL (ref 4.31–10.16)

## 2020-08-10 PROCEDURE — 80048 BASIC METABOLIC PNL TOTAL CA: CPT

## 2020-08-10 PROCEDURE — 36415 COLL VENOUS BLD VENIPUNCTURE: CPT

## 2020-08-10 PROCEDURE — 85025 COMPLETE CBC W/AUTO DIFF WBC: CPT

## 2020-08-10 RX ORDER — LEVOFLOXACIN 500 MG/1
500 TABLET, FILM COATED ORAL EVERY 24 HOURS
Status: ON HOLD | COMMUNITY
End: 2020-08-11 | Stop reason: SDUPTHER

## 2020-08-11 ENCOUNTER — ANESTHESIA (OUTPATIENT)
Dept: PERIOP | Facility: HOSPITAL | Age: 25
End: 2020-08-11

## 2020-08-11 ENCOUNTER — HOSPITAL ENCOUNTER (OUTPATIENT)
Facility: HOSPITAL | Age: 25
Setting detail: OUTPATIENT SURGERY
Discharge: HOME | End: 2020-08-11
Attending: STUDENT IN AN ORGANIZED HEALTH CARE EDUCATION/TRAINING PROGRAM | Admitting: STUDENT IN AN ORGANIZED HEALTH CARE EDUCATION/TRAINING PROGRAM

## 2020-08-11 VITALS
BODY MASS INDEX: 38.62 KG/M2 | HEIGHT: 63 IN | WEIGHT: 218 LBS | SYSTOLIC BLOOD PRESSURE: 130 MMHG | DIASTOLIC BLOOD PRESSURE: 77 MMHG | HEART RATE: 81 BPM | RESPIRATION RATE: 20 BRPM | OXYGEN SATURATION: 97 % | TEMPERATURE: 98.1 F

## 2020-08-11 DIAGNOSIS — S81.801D UNSPECIFIED OPEN WOUND, RIGHT LOWER LEG, SUBSEQUENT ENCOUNTER: ICD-10-CM

## 2020-08-11 DIAGNOSIS — S81.801A WOUND OF RIGHT LOWER EXTREMITY, INITIAL ENCOUNTER: Primary | ICD-10-CM

## 2020-08-11 LAB
EXT PREGNANCY TEST URINE: NEGATIVE
EXT PREGNANCY TEST URINE: NEGATIVE
EXT. CONTROL: NORMAL
EXT. CONTROL: NORMAL

## 2020-08-11 PROCEDURE — 87070 CULTURE OTHR SPECIMN AEROBIC: CPT | Performed by: STUDENT IN AN ORGANIZED HEALTH CARE EDUCATION/TRAINING PROGRAM

## 2020-08-11 PROCEDURE — 88304 TISSUE EXAM BY PATHOLOGIST: CPT | Performed by: PATHOLOGY

## 2020-08-11 PROCEDURE — 87176 TISSUE HOMOGENIZATION CULTR: CPT | Performed by: STUDENT IN AN ORGANIZED HEALTH CARE EDUCATION/TRAINING PROGRAM

## 2020-08-11 PROCEDURE — 87147 CULTURE TYPE IMMUNOLOGIC: CPT | Performed by: STUDENT IN AN ORGANIZED HEALTH CARE EDUCATION/TRAINING PROGRAM

## 2020-08-11 PROCEDURE — 97597 DBRDMT OPN WND 1ST 20 CM/<: CPT

## 2020-08-11 PROCEDURE — 87205 SMEAR GRAM STAIN: CPT | Performed by: STUDENT IN AN ORGANIZED HEALTH CARE EDUCATION/TRAINING PROGRAM

## 2020-08-11 PROCEDURE — 87186 SC STD MICRODIL/AGAR DIL: CPT | Performed by: STUDENT IN AN ORGANIZED HEALTH CARE EDUCATION/TRAINING PROGRAM

## 2020-08-11 RX ORDER — ONDANSETRON 2 MG/ML
INJECTION INTRAMUSCULAR; INTRAVENOUS AS NEEDED
Status: DISCONTINUED | OUTPATIENT
Start: 2020-08-11 | End: 2020-08-11

## 2020-08-11 RX ORDER — MIDAZOLAM HYDROCHLORIDE 2 MG/2ML
INJECTION, SOLUTION INTRAMUSCULAR; INTRAVENOUS AS NEEDED
Status: DISCONTINUED | OUTPATIENT
Start: 2020-08-11 | End: 2020-08-11

## 2020-08-11 RX ORDER — ONDANSETRON 2 MG/ML
4 INJECTION INTRAMUSCULAR; INTRAVENOUS ONCE AS NEEDED
Status: DISCONTINUED | OUTPATIENT
Start: 2020-08-11 | End: 2020-08-11 | Stop reason: HOSPADM

## 2020-08-11 RX ORDER — HYDROMORPHONE HCL/PF 1 MG/ML
0.5 SYRINGE (ML) INJECTION
Status: DISCONTINUED | OUTPATIENT
Start: 2020-08-11 | End: 2020-08-11 | Stop reason: HOSPADM

## 2020-08-11 RX ORDER — LEVOFLOXACIN 500 MG/1
500 TABLET, FILM COATED ORAL EVERY 24 HOURS
Qty: 10 TABLET | Refills: 0 | Status: SHIPPED | OUTPATIENT
Start: 2020-08-11 | End: 2020-08-21

## 2020-08-11 RX ORDER — FENTANYL CITRATE 50 UG/ML
INJECTION, SOLUTION INTRAMUSCULAR; INTRAVENOUS AS NEEDED
Status: DISCONTINUED | OUTPATIENT
Start: 2020-08-11 | End: 2020-08-11

## 2020-08-11 RX ORDER — SODIUM CHLORIDE, SODIUM LACTATE, POTASSIUM CHLORIDE, CALCIUM CHLORIDE 600; 310; 30; 20 MG/100ML; MG/100ML; MG/100ML; MG/100ML
125 INJECTION, SOLUTION INTRAVENOUS CONTINUOUS
Status: DISCONTINUED | OUTPATIENT
Start: 2020-08-11 | End: 2020-08-11 | Stop reason: HOSPADM

## 2020-08-11 RX ORDER — PROPOFOL 10 MG/ML
INJECTION, EMULSION INTRAVENOUS AS NEEDED
Status: DISCONTINUED | OUTPATIENT
Start: 2020-08-11 | End: 2020-08-11

## 2020-08-11 RX ORDER — ACETAMINOPHEN 325 MG/1
975 TABLET ORAL ONCE
Status: COMPLETED | OUTPATIENT
Start: 2020-08-11 | End: 2020-08-11

## 2020-08-11 RX ORDER — LIDOCAINE HYDROCHLORIDE 10 MG/ML
0.5 INJECTION, SOLUTION EPIDURAL; INFILTRATION; INTRACAUDAL; PERINEURAL ONCE AS NEEDED
Status: DISCONTINUED | OUTPATIENT
Start: 2020-08-11 | End: 2020-08-11 | Stop reason: HOSPADM

## 2020-08-11 RX ORDER — LEVOFLOXACIN 5 MG/ML
750 INJECTION, SOLUTION INTRAVENOUS
Status: COMPLETED | OUTPATIENT
Start: 2020-08-11 | End: 2020-08-11

## 2020-08-11 RX ORDER — SCOLOPAMINE TRANSDERMAL SYSTEM 1 MG/1
1 PATCH, EXTENDED RELEASE TRANSDERMAL ONCE
Status: DISCONTINUED | OUTPATIENT
Start: 2020-08-11 | End: 2020-08-11 | Stop reason: HOSPADM

## 2020-08-11 RX ORDER — DEXAMETHASONE SODIUM PHOSPHATE 10 MG/ML
INJECTION, SOLUTION INTRAMUSCULAR; INTRAVENOUS AS NEEDED
Status: DISCONTINUED | OUTPATIENT
Start: 2020-08-11 | End: 2020-08-11

## 2020-08-11 RX ORDER — FENTANYL CITRATE/PF 50 MCG/ML
50 SYRINGE (ML) INJECTION
Status: DISCONTINUED | OUTPATIENT
Start: 2020-08-11 | End: 2020-08-11 | Stop reason: HOSPADM

## 2020-08-11 RX ORDER — BUPIVACAINE HYDROCHLORIDE AND EPINEPHRINE 2.5; 5 MG/ML; UG/ML
INJECTION, SOLUTION INFILTRATION; PERINEURAL AS NEEDED
Status: DISCONTINUED | OUTPATIENT
Start: 2020-08-11 | End: 2020-08-11 | Stop reason: HOSPADM

## 2020-08-11 RX ORDER — KETAMINE HYDROCHLORIDE 50 MG/ML
INJECTION, SOLUTION, CONCENTRATE INTRAMUSCULAR; INTRAVENOUS AS NEEDED
Status: DISCONTINUED | OUTPATIENT
Start: 2020-08-11 | End: 2020-08-11

## 2020-08-11 RX ORDER — LIDOCAINE HYDROCHLORIDE 10 MG/ML
INJECTION, SOLUTION EPIDURAL; INFILTRATION; INTRACAUDAL; PERINEURAL AS NEEDED
Status: DISCONTINUED | OUTPATIENT
Start: 2020-08-11 | End: 2020-08-11

## 2020-08-11 RX ADMIN — PROPOFOL 200 MG: 10 INJECTION, EMULSION INTRAVENOUS at 12:41

## 2020-08-11 RX ADMIN — ACETAMINOPHEN 975 MG: 325 TABLET ORAL at 11:38

## 2020-08-11 RX ADMIN — MIDAZOLAM HYDROCHLORIDE 2 MG: 1 INJECTION, SOLUTION INTRAMUSCULAR; INTRAVENOUS at 11:56

## 2020-08-11 RX ADMIN — FENTANYL CITRATE 25 MCG: 50 INJECTION, SOLUTION INTRAMUSCULAR; INTRAVENOUS at 13:04

## 2020-08-11 RX ADMIN — KETAMINE HYDROCHLORIDE 10 MG: 50 INJECTION INTRAMUSCULAR; INTRAVENOUS at 12:53

## 2020-08-11 RX ADMIN — LEVOFLOXACIN 750 MG: 5 INJECTION, SOLUTION INTRAVENOUS at 11:38

## 2020-08-11 RX ADMIN — SCOPALAMINE 1 PATCH: 1 PATCH, EXTENDED RELEASE TRANSDERMAL at 12:08

## 2020-08-11 RX ADMIN — DEXAMETHASONE SODIUM PHOSPHATE 4 MG: 10 INJECTION, SOLUTION INTRAMUSCULAR; INTRAVENOUS at 12:41

## 2020-08-11 RX ADMIN — LIDOCAINE HYDROCHLORIDE 50 MG: 10 INJECTION, SOLUTION EPIDURAL; INFILTRATION; INTRACAUDAL; PERINEURAL at 12:41

## 2020-08-11 RX ADMIN — SODIUM CHLORIDE, SODIUM LACTATE, POTASSIUM CHLORIDE, AND CALCIUM CHLORIDE 125 ML/HR: .6; .31; .03; .02 INJECTION, SOLUTION INTRAVENOUS at 11:38

## 2020-08-11 RX ADMIN — KETAMINE HYDROCHLORIDE 20 MG: 50 INJECTION INTRAMUSCULAR; INTRAVENOUS at 12:49

## 2020-08-11 RX ADMIN — ONDANSETRON 4 MG: 2 INJECTION INTRAMUSCULAR; INTRAVENOUS at 12:41

## 2020-08-11 RX ADMIN — FENTANYL CITRATE 50 MCG: 50 INJECTION, SOLUTION INTRAMUSCULAR; INTRAVENOUS at 12:43

## 2020-08-11 NOTE — ANESTHESIA PREPROCEDURE EVALUATION
Procedure:  LEG WOUND DEBRIDEMENTS (Right Knee)    Relevant Problems   ANESTHESIA   (+) PONV (postoperative nausea and vomiting)      CARDIO (within normal limits)      ENDO (within normal limits)      GI/HEPATIC (within normal limits)      /RENAL (within normal limits)      GYN (within normal limits)      HEMATOLOGY (within normal limits)      MUSCULOSKELETAL (within normal limits)      NEURO/PSYCH (within normal limits)      PULMONARY (within normal limits)      Other   (+) BMI 38 0-38 9,adult   (+) Cellulitis of right lower leg   (+) Tobacco abuse   (+) Wound infection         Recent Results (from the past 672 hour(s))   CBC and differential    Collection Time: 07/21/20 12:29 PM   Result Value Ref Range    WBC 11 91 (H) 4 31 - 10 16 Thousand/uL    RBC 4 79 3 81 - 5 12 Million/uL    Hemoglobin 14 7 11 5 - 15 4 g/dL    Hematocrit 44 5 34 8 - 46 1 %    MCV 93 82 - 98 fL    MCH 30 7 26 8 - 34 3 pg    MCHC 33 0 31 4 - 37 4 g/dL    RDW 13 1 11 6 - 15 1 %    MPV 10 0 8 9 - 12 7 fL    Platelets 482 575 - 085 Thousands/uL    nRBC 0 /100 WBCs    Neutrophils Relative 64 43 - 75 %    Immat GRANS % 0 0 - 2 %    Lymphocytes Relative 27 14 - 44 %    Monocytes Relative 6 4 - 12 %    Eosinophils Relative 2 0 - 6 %    Basophils Relative 1 0 - 1 %    Neutrophils Absolute 7 69 (H) 1 85 - 7 62 Thousands/µL    Immature Grans Absolute 0 05 0 00 - 0 20 Thousand/uL    Lymphocytes Absolute 3 26 0 60 - 4 47 Thousands/µL    Monocytes Absolute 0 66 0 17 - 1 22 Thousand/µL    Eosinophils Absolute 0 18 0 00 - 0 61 Thousand/µL    Basophils Absolute 0 07 0 00 - 0 10 Thousands/µL   Comprehensive metabolic panel    Collection Time: 07/21/20 12:29 PM   Result Value Ref Range    Sodium 139 136 - 145 mmol/L    Potassium 4 2 3 5 - 5 3 mmol/L    Chloride 104 100 - 108 mmol/L    CO2 25 21 - 32 mmol/L    ANION GAP 10 4 - 13 mmol/L    BUN 8 5 - 25 mg/dL    Creatinine 0 87 0 60 - 1 30 mg/dL    Glucose 89 65 - 140 mg/dL    Calcium 8 9 8 3 - 10 1 mg/dL AST 20 5 - 45 U/L    ALT 44 12 - 78 U/L    Alkaline Phosphatase 129 (H) 46 - 116 U/L    Total Protein 7 6 6 4 - 8 2 g/dL    Albumin 3 7 3 5 - 5 0 g/dL    Total Bilirubin 0 26 0 20 - 1 00 mg/dL    eGFR 93 ml/min/1 73sq m   Blood culture #1    Collection Time: 07/21/20 12:29 PM    Specimen: Arm, Left; Blood   Result Value Ref Range    Blood Culture No Growth After 5 Days  Blood culture #2    Collection Time: 07/21/20 12:29 PM    Specimen: Arm, Right; Blood   Result Value Ref Range    Blood Culture Gram-positive chris (A)     Gram Stain Result Gram positive rods (A)        Susceptibility    Gram-positive chris - BAR     ZID Performed Yes     Wound culture and Gram stain    Collection Time: 07/21/20 12:29 PM    Specimen: Leg, Right;  Wound   Result Value Ref Range    Wound Culture 4+ Growth of Morganella morganii (A)     Wound Culture 3+ Growth of      Gram Stain Result No polys seen (A)     Gram Stain Result 2+ Gram positive cocci in pairs (A)     Gram Stain Result 1+ Gram negative rods (A)        Susceptibility    Morganella morganii - BAR     ZID Performed Yes       Amoxicillin + Clavulanate >16/8 Resistant ug/ml     Ampicillin ($$) >16 00 Resistant ug/ml     Ampicillin + Sulbactam ($) 8/4 Susceptible ug/ml     Aztreonam ($$$)  <=4 Susceptible ug/ml     Cefazolin ($) >16 00 Resistant ug/ml     Cefotaxime ($) <=2 00 Susceptible ug/ml     Ceftazidime ($$) <=1 Susceptible ug/ml     Ceftriaxone ($$) <=1 00 Susceptible ug/ml     Cefuroxime ($$) >16 Resistant ug/ml     Ciprofloxacin ($)  <=1 00 Susceptible ug/ml     Ertapenem ($$$) <=0 5 Susceptible ug/ml     Gentamicin ($$) <=1 Susceptible ug/ml     Levofloxacin ($) <=0 25 Susceptible ug/ml     Piperacillin + Tazobactam ($$$) <=4 Susceptible ug/ml     Tetracycline <=4 Susceptible ug/ml     Tobramycin ($) <=1 Susceptible ug/ml     Trimethoprim + Sulfamethoxazole ($$$) <=2/38 Susceptible ug/ml   Lactic acid, plasma    Collection Time: 07/21/20  1:26 PM   Result Value Ref Range    LACTIC ACID 0 7 0 5 - 2 0 mmol/L   TSH, 3rd generation    Collection Time: 07/22/20  4:39 AM   Result Value Ref Range    TSH 3RD GENERATON 1 475 0 358 - 3 740 uIU/mL   CBC (With Platelets)    Collection Time: 07/22/20  4:39 AM   Result Value Ref Range    WBC 9 32 4 31 - 10 16 Thousand/uL    RBC 4 66 3 81 - 5 12 Million/uL    Hemoglobin 13 8 11 5 - 15 4 g/dL    Hematocrit 43 2 34 8 - 46 1 %    MCV 93 82 - 98 fL    MCH 29 6 26 8 - 34 3 pg    MCHC 31 9 31 4 - 37 4 g/dL    RDW 13 1 11 6 - 15 1 %    Platelets 777 480 - 414 Thousands/uL    MPV 10 2 8 9 - 12 7 fL   Basic metabolic panel    Collection Time: 07/22/20  4:39 AM   Result Value Ref Range    Sodium 140 136 - 145 mmol/L    Potassium 4 0 3 5 - 5 3 mmol/L    Chloride 106 100 - 108 mmol/L    CO2 27 21 - 32 mmol/L    ANION GAP 7 4 - 13 mmol/L    BUN 8 5 - 25 mg/dL    Creatinine 0 81 0 60 - 1 30 mg/dL    Glucose 91 65 - 140 mg/dL    Calcium 8 3 8 3 - 10 1 mg/dL    eGFR 101 ml/min/1 73sq m   Anaerobic culture and Gram stain    Collection Time: 07/22/20  8:44 AM    Specimen: Wound   Result Value Ref Range    Anaerobic Culture Few Colonies of Anaerobic Gram Negative Bacilli (A)        Susceptibility    Anaerobic Gram Negative Bacilli - BAR     ZID Performed Yes      Anaerobic Gram Negative Bacilli -  (no method available)     Beta Lactamase Negative     Vancomycin, trough Please draw peripherally  Contact pharmacy with results and any questions   Thank You    Collection Time: 07/22/20 12:17 PM   Result Value Ref Range    Vancomycin Tr 12 5 10 0 - 20 0 ug/mL   CBC and differential    Collection Time: 07/23/20 11:17 AM   Result Value Ref Range    WBC 9 82 4 31 - 10 16 Thousand/uL    RBC 4 86 3 81 - 5 12 Million/uL    Hemoglobin 14 7 11 5 - 15 4 g/dL    Hematocrit 45 3 34 8 - 46 1 %    MCV 93 82 - 98 fL    MCH 30 2 26 8 - 34 3 pg    MCHC 32 5 31 4 - 37 4 g/dL    RDW 13 0 11 6 - 15 1 %    MPV 9 7 8 9 - 12 7 fL    Platelets 431 461 - 308 Thousands/uL    nRBC 0 /100 WBCs    Neutrophils Relative 67 43 - 75 %    Immat GRANS % 0 0 - 2 %    Lymphocytes Relative 24 14 - 44 %    Monocytes Relative 6 4 - 12 %    Eosinophils Relative 2 0 - 6 %    Basophils Relative 1 0 - 1 %    Neutrophils Absolute 6 55 1 85 - 7 62 Thousands/µL    Immature Grans Absolute 0 02 0 00 - 0 20 Thousand/uL    Lymphocytes Absolute 2 38 0 60 - 4 47 Thousands/µL    Monocytes Absolute 0 61 0 17 - 1 22 Thousand/µL    Eosinophils Absolute 0 20 0 00 - 0 61 Thousand/µL    Basophils Absolute 0 06 0 00 - 0 10 Thousands/µL   Vancomycin, trough Draw Vancomycin trough peripherally and call pharmacy with any questions or concerns    Collection Time: 07/23/20 11:17 AM   Result Value Ref Range    Vancomycin Tr 18 9 10 0 - 20 0 ug/mL   Basic metabolic panel    Collection Time: 08/10/20  2:07 PM   Result Value Ref Range    Sodium 140 136 - 145 mmol/L    Potassium 4 0 3 5 - 5 3 mmol/L    Chloride 105 100 - 108 mmol/L    CO2 28 21 - 32 mmol/L    ANION GAP 7 4 - 13 mmol/L    BUN 6 5 - 25 mg/dL    Creatinine 0 83 0 60 - 1 30 mg/dL    Glucose 98 65 - 140 mg/dL    Calcium 9 4 8 3 - 10 1 mg/dL    eGFR 98 ml/min/1 73sq m   CBC and differential    Collection Time: 08/10/20  2:07 PM   Result Value Ref Range    WBC 10 18 (H) 4 31 - 10 16 Thousand/uL    RBC 4 73 3 81 - 5 12 Million/uL    Hemoglobin 14 4 11 5 - 15 4 g/dL    Hematocrit 44 7 34 8 - 46 1 %    MCV 95 82 - 98 fL    MCH 30 4 26 8 - 34 3 pg    MCHC 32 2 31 4 - 37 4 g/dL    RDW 12 5 11 6 - 15 1 %    MPV 9 7 8 9 - 12 7 fL    Platelets 369 410 - 381 Thousands/uL    nRBC 0 /100 WBCs    Neutrophils Relative 64 43 - 75 %    Immat GRANS % 0 0 - 2 %    Lymphocytes Relative 27 14 - 44 %    Monocytes Relative 6 4 - 12 %    Eosinophils Relative 2 0 - 6 %    Basophils Relative 1 0 - 1 %    Neutrophils Absolute 6 59 1 85 - 7 62 Thousands/µL    Immature Grans Absolute 0 04 0 00 - 0 20 Thousand/uL    Lymphocytes Absolute 2 73 0 60 - 4 47 Thousands/µL    Monocytes Absolute 0 59 0 17 - 1 22 Thousand/µL    Eosinophils Absolute 0 18 0 00 - 0 61 Thousand/µL    Basophils Absolute 0 05 0 00 - 0 10 Thousands/µL   POCT pregnancy, urine    Collection Time: 08/11/20 10:53 AM   Result Value Ref Range    EXT Preg Test, Ur Negative Negative    Control Valid Valid   Urine Pregnancy (Holding Area Only) POCT    Collection Time: 08/11/20 10:54 AM   Result Value Ref Range    EXT Preg Test, Ur Negative Negative    Control Valid Valid     No complications documented  Physical Exam    Airway    Mallampati score: II  TM Distance: >3 FB  Neck ROM: full     Dental   No notable dental hx     Cardiovascular  Rhythm: regular, Rate: normal, No murmur,     Pulmonary  Breath sounds clear to auscultation,     Other Findings        Anesthesia Plan  ASA Score- 2     Anesthesia Type- general with ASA Monitors  Additional Monitors:   Airway Plan: LMA  Plan Factors-    Chart reviewed  Existing labs reviewed  Patient summary reviewed  Induction- intravenous  Postoperative Plan- Plan for postoperative opioid use  Informed Consent- Anesthetic plan and risks discussed with patient  I personally reviewed this patient with the CRNA  Discussed and agreed on the Anesthesia Plan with the CRNA  Jr Ford

## 2020-08-11 NOTE — OP NOTE
OPERATIVE REPORT  PATIENT NAME: Marina Maurice    :  1995  MRN: [id number]  Pt Location: OW OR ROOM 01    SURGERY DATE: 2020    Surgeon(s) and Role:     Ailyn Charles DO - Primary     * aNima Francois PA-C - Assisting    Preop Diagnosis:  Open wound of the right leg    Post-Op Diagnosis Codes:    Same    Procedure(s) (LRB):  LEG WOUND EXCISIONAL  DEBRIDEMENT (Right)    Specimen(s):  ID Type Source Tests Collected by Time Destination   1 : RIGHT LEG WOUND DEBRIDED TISSUE  Tissue Leg, Right TISSUE EXAM Giuseppe Hernandez DO 2020  1:08 PM    A : RIGHT leg wound tissue for culture Tissue Leg, Right CULTURE, TISSUE AND GRAM STAIN Giuseppe HeDO laina 2020 12:57 PM        Estimated Blood Loss:   Minimal    Drains:  * No LDAs found *    Anesthesia Type:   General    Operative Indications:  Nonhealing wound of the right lower extremity    Operative Findings:  Positive for 3 small wounds of the right lower extremities with skin bridges in between  The wound was found a tunnel underneath these skin bridges  The wound was also found to tunnels slightly medially and laterally    Complications:   None    Procedure and Technique:  The patient was brought to the operating room  A time-out was held the patient identified and procedure verified  Laterality was confirmed preoperatively  General anesthesia was administered  Appropriate antibiotic prophylaxis and DVT prophylaxis was used  The area of the right lower extremity was prepped and draped in usual sterile fashion using Betadine  Local anesthesia using 0 25% Marcaine with epinephrine was infiltrated around the open wound of the right leg  The wound was inspected  There is an approximately 1 cm tunnel noted medially and a 0 5 cm tunnel noted laterally  There were 2 skin bridges in between the wound  The wound did tunnel underneath both these areas  The skin bridges were removed using sharp dissection with a 15 blade scalpel    The skin overlying the tunneled portion of the wound was also removed using a 15 blade scalpel  This was a sharp excisional debridement of tissue using a scalpel  Skin and subcutaneous tissue were debrided  Hemostasis was achieved using electrocautery  The base of the wound was debrided using a curette until healthy tissue was identified  Hemostasis was achieved using electrocautery  The wound then measured approximately 7 x 4 x 1 cm  The wound was packed with a saline moistened 4 x 4 and covered with a clean sterile dressing  The patient tolerated the procedure well was transferred to recovery in stable condition  At the end the procedure all needle instrument sponge counts were correct x2     A physician assistant was required during the procedure for retraction tissue handling,dissection and suturing    Patient Disposition:  PACU     SIGNATURE: Bridgette Mccrary DO  DATE: August 11, 2020  TIME: 1:13 PM

## 2020-08-11 NOTE — DISCHARGE INSTRUCTIONS
I&D of right lower extremity wound discharge instructions    New wound care instructions:  Discontinue the gel you were using prior to surgery  You may take the dressing off and shower with the wound uncovered or you may leave covered to shower  After showering, the wound should be packed with a saline moistened gauze pad  Make sure the gauze pad is not saturated and that is feels the entire wound  Place dry gauze over the wound and wrap with gauze or the Pro wrap you were using previously  Keep the wound covered at all times except possibly, when showering    Continue Levaquin, a new prescription was sent to your pharmacy in case it is needed  Call [phone] with any questions or concerns  You should also have a follow-up appointment with Maryanne Rojas PA-C in 1 week to check your wound  Hyacinth Cool

## 2020-08-11 NOTE — INTERVAL H&P NOTE
H&P reviewed  After examining the patient I find no changes in the patients condition since the H&P had been written      Vitals:    08/11/20 1104   BP: 118/75   Pulse: 92   Resp: 18   Temp: 98 1 °F (36 7 °C)   SpO2: 99%

## 2020-08-11 NOTE — ANESTHESIA POSTPROCEDURE EVALUATION
Post-Op Assessment Note    CV Status:  Stable  Pain Score: 0    Pain management: adequate     Mental Status:  Alert and awake   Hydration Status:  Euvolemic   PONV Controlled:  Controlled   Airway Patency:  Patent      Post Op Vitals Reviewed: Yes      Staff: CRNA         No complications documented      BP (P) 130/65 (08/11/20 1318)    Temp 97 6 °F (36 4 °C) (08/11/20 1318)    Pulse (P) 94 (08/11/20 1318)   Resp (P) 17 (08/11/20 1318)    SpO2 (P) 99 % (08/11/20 1318)

## 2020-08-13 LAB
BACTERIA TISS AEROBE CULT: ABNORMAL
BACTERIA TISS AEROBE CULT: ABNORMAL
GRAM STN SPEC: ABNORMAL
GRAM STN SPEC: ABNORMAL

## (undated) DEVICE — CURITY STRETCH BANDAGE: Brand: CURITY

## (undated) DEVICE — U-DRAPE: Brand: CONVERTORS

## (undated) DEVICE — SYRINGE 10ML LL

## (undated) DEVICE — GLOVE SRG BIOGEL 6

## (undated) DEVICE — DRAPE EQUIPMENT RF WAND

## (undated) DEVICE — TUBING SUCTION 5MM X 12 FT

## (undated) DEVICE — LIGHT HANDLE COVER SLEEVE DISP BLUE STELLAR

## (undated) DEVICE — INTENDED FOR TISSUE SEPARATION, AND OTHER PROCEDURES THAT REQUIRE A SHARP SURGICAL BLADE TO PUNCTURE OR CUT.: Brand: BARD-PARKER SAFETY BLADES SIZE 15, STERILE

## (undated) DEVICE — GLOVE INDICATOR PI UNDERGLOVE SZ 6.5 BLUE

## (undated) DEVICE — GLOVE SRG BIOGEL ECLIPSE 7

## (undated) DEVICE — GAUZE SPONGES,16 PLY: Brand: CURITY

## (undated) DEVICE — GLOVE INDICATOR PI UNDERGLOVE SZ 7 BLUE

## (undated) DEVICE — WET SKIN PREP TRAY: Brand: MEDLINE INDUSTRIES, INC.

## (undated) DEVICE — STOCKINETTE,IMPERVIOUS,12X48,STERILE: Brand: MEDLINE

## (undated) DEVICE — SPONGE STICK WITH PVP-I: Brand: KENDALL

## (undated) DEVICE — MINOR PROCEDURE DRAPE: Brand: CONVERTORS

## (undated) DEVICE — STANDARD SURGICAL GOWN, L: Brand: CONVERTORS

## (undated) DEVICE — BETHLEHEM UNIVERSAL OUTPATIENT: Brand: CARDINAL HEALTH

## (undated) DEVICE — DRAPE SHEET THREE QUARTER

## (undated) DEVICE — NEEDLE 25G X 1 1/2

## (undated) DEVICE — PLUMEPEN PRO 10FT

## (undated) DEVICE — STERILE LATEX POWDER-FREE SURGICAL GLOVESWITH NITRILE COATING: Brand: PROTEXIS

## (undated) DEVICE — MEDI-VAC YANK SUCT HNDL W/TPRD BULBOUS TIP: Brand: CARDINAL HEALTH

## (undated) DEVICE — DRAPE TOWEL: Brand: CONVERTORS

## (undated) DEVICE — PACK GENERAL LF

## (undated) DEVICE — SPECIMEN CONTAINER STERILE PEEL PACK

## (undated) DEVICE — ABDOMINAL PAD: Brand: DERMACEA

## (undated) DEVICE — COBAN 6 IN STERILE

## (undated) DEVICE — ASTOUND IMPERVIOUS SURGICAL GOWN: Brand: CONVERTORS